# Patient Record
Sex: FEMALE | Race: WHITE | NOT HISPANIC OR LATINO | Employment: OTHER | URBAN - NONMETROPOLITAN AREA
[De-identification: names, ages, dates, MRNs, and addresses within clinical notes are randomized per-mention and may not be internally consistent; named-entity substitution may affect disease eponyms.]

---

## 2021-06-01 ENCOUNTER — OFFICE VISIT (OUTPATIENT)
Dept: CARDIOLOGY | Facility: CLINIC | Age: 56
End: 2021-06-01

## 2021-06-01 VITALS
BODY MASS INDEX: 38.27 KG/M2 | OXYGEN SATURATION: 95 % | DIASTOLIC BLOOD PRESSURE: 80 MMHG | HEIGHT: 63 IN | SYSTOLIC BLOOD PRESSURE: 146 MMHG | HEART RATE: 78 BPM | WEIGHT: 216 LBS

## 2021-06-01 DIAGNOSIS — R07.9 CHEST PAIN, UNSPECIFIED TYPE: Primary | ICD-10-CM

## 2021-06-01 DIAGNOSIS — R00.2 PALPITATIONS: ICD-10-CM

## 2021-06-01 DIAGNOSIS — R01.1 HEART MURMUR: ICD-10-CM

## 2021-06-01 DIAGNOSIS — R06.81 APNEA: ICD-10-CM

## 2021-06-01 DIAGNOSIS — R06.02 SHORTNESS OF BREATH: ICD-10-CM

## 2021-06-01 DIAGNOSIS — R60.0 LOWER EXTREMITY EDEMA: ICD-10-CM

## 2021-06-01 PROBLEM — R60.9 ACCUMULATION OF FLUID IN TISSUES: Status: ACTIVE | Noted: 2021-06-01

## 2021-06-01 PROCEDURE — 93000 ELECTROCARDIOGRAM COMPLETE: CPT | Performed by: PHYSICIAN ASSISTANT

## 2021-06-01 PROCEDURE — 99204 OFFICE O/P NEW MOD 45 MIN: CPT | Performed by: PHYSICIAN ASSISTANT

## 2021-06-01 RX ORDER — GABAPENTIN 300 MG/1
300 CAPSULE ORAL 2 TIMES DAILY
COMMUNITY

## 2021-06-01 RX ORDER — ARIPIPRAZOLE 5 MG/1
5 TABLET ORAL DAILY
COMMUNITY
Start: 2021-05-03

## 2021-06-01 RX ORDER — SOFT LENS DISINFECTANT
SOLUTION, NON-ORAL MISCELLANEOUS AS NEEDED
COMMUNITY

## 2021-06-01 RX ORDER — MELOXICAM 15 MG/1
15 TABLET ORAL DAILY
COMMUNITY
Start: 2021-05-10

## 2021-06-01 RX ORDER — DULAGLUTIDE 0.75 MG/.5ML
INJECTION, SOLUTION SUBCUTANEOUS WEEKLY
COMMUNITY
Start: 2021-05-31

## 2021-06-01 RX ORDER — LAMOTRIGINE 200 MG/1
200 TABLET ORAL DAILY
COMMUNITY
Start: 2021-04-06

## 2021-06-01 RX ORDER — CLONAZEPAM 0.5 MG/1
TABLET ORAL
COMMUNITY
Start: 2014-09-10

## 2021-06-01 RX ORDER — NITROGLYCERIN 0.4 MG/1
TABLET SUBLINGUAL
Qty: 25 TABLET | Refills: 11 | Status: SHIPPED | OUTPATIENT
Start: 2021-06-01

## 2021-06-01 RX ORDER — TRIAMTERENE AND HYDROCHLOROTHIAZIDE 37.5; 25 MG/1; MG/1
TABLET ORAL DAILY
COMMUNITY
Start: 2014-09-10

## 2021-06-01 RX ORDER — LEVOTHYROXINE SODIUM 0.05 MG/1
TABLET ORAL DAILY
COMMUNITY
Start: 2021-05-10

## 2021-06-01 RX ORDER — HYDROXYZINE PAMOATE 50 MG/1
25 CAPSULE ORAL 3 TIMES DAILY PRN
COMMUNITY
Start: 2021-05-03

## 2021-06-01 NOTE — PATIENT INSTRUCTIONS

## 2021-06-01 NOTE — PROGRESS NOTES
Subjective   Brenda Zazueta is a 56 y.o. female     Chief Complaint   Patient presents with   • New patient     Referred by Dr Carmichael   • Dizziness   • Edema     Stinging and numbness leg   • Shortness of Breath       HPI    Patient is a 56-year-old female that presents to the office to have evaluation.  She has no documented history of coronary disease or structural heart disease to my understanding.    She has been having issues of substernal discomfort at times in her chest.  She will feel a slight aching sensation substernally.  This occurs at random and resolves.  She has been short of breath.  When she tries to do activities she notices significant exertional dyspnea.  She also describes to me that she will at times have PND and orthopnea.  She has had some lower extremity edema and has been concerned as well.  She was placed on Maxide to help in regards to lower extremity edema    She has felt palpitations at times.  She will notice a random fluttering sensation in her chest.  She does not describe associated dizziness presyncope or syncope.  Otherwise, she presents today to have evaluation.          Current Outpatient Medications   Medication Sig Dispense Refill   • ALBUTEROL IN Inhale.     • ARIPiprazole (ABILIFY) 5 MG tablet 5 mg Daily.     • gabapentin (NEURONTIN) 300 MG capsule Take 300 mg by mouth 2 (two) times a day.     • hydrOXYzine pamoate (VISTARIL) 25 MG capsule 25 mg 3 (Three) Times a Day As Needed.     • lamoTRIgine (LaMICtal) 150 MG tablet Take 150 mg by mouth Daily.     • levothyroxine (SYNTHROID, LEVOTHROID) 50 MCG tablet Take  by mouth Daily.     • Loratadine-Pseudoephedrine (CLARITIN-D 24 HOUR PO) Take  by mouth.     • meloxicam (MOBIC) 15 MG tablet Take 15 mg by mouth Daily.     • Nebulizer device As Needed.     • triamterene-hydrochlorothiazide (MAXZIDE-25) 37.5-25 MG per tablet Take  by mouth Daily.     • Trulicity 0.75 MG/0.5ML solution pen-injector 1 (One) Time Per Week.     •  clonazePAM (KlonoPIN) 0.5 MG tablet Take  by mouth.     • nitroglycerin (NITROSTAT) 0.4 MG SL tablet 1 under the tongue as needed for angina, may repeat q5mins for up three doses 25 tablet 11     No current facility-administered medications for this visit.       Sulfa antibiotics    Past Medical History:   Diagnosis Date   • Anxiety    • Asthma    • Bipolar 2 disorder (CMS/HCC)    • Diabetes mellitus (CMS/HCC)    • Fibromyalgia    • Hypertension    • Hypothyroidism    • IBS (irritable bowel syndrome)    • OCD (obsessive compulsive disorder)    • Rheumatoid arthritis (CMS/HCC)        Social History     Socioeconomic History   • Marital status: Single     Spouse name: Not on file   • Number of children: Not on file   • Years of education: Not on file   • Highest education level: Not on file   Tobacco Use   • Smoking status: Never Smoker   • Smokeless tobacco: Never Used   Substance and Sexual Activity   • Alcohol use: Not Currently   • Drug use: Yes     Types: Marijuana   • Sexual activity: Defer       Family History   Problem Relation Age of Onset   • Heart disease Mother    • Hypertension Mother    • Thyroid disease Mother    • Lymphoma Mother    • Heart disease Father    • Diabetes Father    • Hypertension Father        Review of Systems   Constitutional: Positive for fatigue. Negative for chills and fever.   HENT: Positive for sinus pressure. Negative for congestion and sore throat.    Eyes: Positive for visual disturbance (glasses).   Respiratory: Positive for chest tightness (soreness x 6 months) and shortness of breath.    Cardiovascular: Positive for chest pain, palpitations and leg swelling.   Gastrointestinal: Positive for constipation and diarrhea. Negative for abdominal pain, blood in stool, nausea and vomiting.   Endocrine: Negative.  Negative for cold intolerance and heat intolerance.   Genitourinary: Negative.  Negative for dysuria, frequency, hematuria and urgency.   Musculoskeletal: Positive for back  "pain. Negative for arthralgias and neck pain.   Skin: Negative.  Negative for rash.   Allergic/Immunologic: Positive for environmental allergies. Negative for food allergies.   Neurological: Positive for dizziness (8-10 months if standing very long). Negative for syncope and light-headedness.   Hematological: Bruises/bleeds easily (bruises).   Psychiatric/Behavioral: Positive for agitation and sleep disturbance (wakes with soa, denies cp). The patient is nervous/anxious.        Objective   Vitals:    06/01/21 1432   BP: 146/80   BP Location: Left arm   Patient Position: Sitting   Pulse: 78   SpO2: 95%   Weight: 98 kg (216 lb)   Height: 160 cm (63\")      /80 (BP Location: Left arm, Patient Position: Sitting)   Pulse 78   Ht 160 cm (63\")   Wt 98 kg (216 lb)   SpO2 95%   BMI 38.26 kg/m²     Lab Results (most recent)     None          Physical Exam  Vitals and nursing note reviewed.   Constitutional:       General: She is not in acute distress.     Appearance: Normal appearance. She is well-developed.   HENT:      Head: Normocephalic and atraumatic.   Eyes:      General: No scleral icterus.        Right eye: No discharge.         Left eye: No discharge.      Conjunctiva/sclera: Conjunctivae normal.   Neck:      Vascular: Hepatojugular reflux present. No carotid bruit.   Cardiovascular:      Rate and Rhythm: Normal rate and regular rhythm.      Heart sounds: Murmur heard.   No friction rub. No gallop.    Pulmonary:      Effort: Pulmonary effort is normal. No respiratory distress.      Breath sounds: Normal breath sounds. No wheezing or rales.   Chest:      Chest wall: No tenderness.   Musculoskeletal:      Right lower leg: Edema present.      Left lower leg: Edema present.   Skin:     General: Skin is warm and dry.      Coloration: Skin is not pale.      Findings: No erythema or rash.   Neurological:      Mental Status: She is alert and oriented to person, place, and time.      Cranial Nerves: No cranial nerve " deficit.   Psychiatric:         Behavior: Behavior normal.         Procedure     ECG 12 Lead    Date/Time: 6/1/2021 2:44 PM  Performed by: Ehsan Merchant PA  Authorized by: Ehsan Merchant PA   Comparison: not compared with previous ECG   Previous ECG: no previous ECG available  Comments: EKG demonstrates sinus rhythm at 76 bpm with first-degree AV block and no acute ST changes                 Assessment/Plan     Problems Addressed this Visit        Cardiac and Vasculature    Heart murmur    Relevant Orders    Adult Transthoracic Echo Complete W/ Cont if Necessary Per Protocol    Treadmill Stress Test    Cardiac Event Monitor    Home Sleep Study    Palpitations    Relevant Orders    Adult Transthoracic Echo Complete W/ Cont if Necessary Per Protocol    Treadmill Stress Test    Cardiac Event Monitor    Home Sleep Study    Chest pain - Primary    Relevant Orders    Adult Transthoracic Echo Complete W/ Cont if Necessary Per Protocol    Treadmill Stress Test    Cardiac Event Monitor    Home Sleep Study       Pulmonary and Pneumonias    Shortness of breath    Relevant Orders    Adult Transthoracic Echo Complete W/ Cont if Necessary Per Protocol    Treadmill Stress Test    Cardiac Event Monitor    Home Sleep Study       Symptoms and Signs    Lower extremity edema    Relevant Orders    Adult Transthoracic Echo Complete W/ Cont if Necessary Per Protocol    Treadmill Stress Test    Cardiac Event Monitor    Home Sleep Study      Other Visit Diagnoses     Apnea        Relevant Orders    Home Sleep Study      Diagnoses       Codes Comments    Chest pain, unspecified type    -  Primary ICD-10-CM: R07.9  ICD-9-CM: 786.50     Shortness of breath     ICD-10-CM: R06.02  ICD-9-CM: 786.05     Palpitations     ICD-10-CM: R00.2  ICD-9-CM: 785.1     Heart murmur     ICD-10-CM: R01.1  ICD-9-CM: 785.2     Lower extremity edema     ICD-10-CM: R60.0  ICD-9-CM: 782.3     Apnea     ICD-10-CM: R06.81  ICD-9-CM: 786.03                      Recommendation  1.  Patient is a 56-year-old female that has been complaining of substernal discomfort in her chest but also shortness of breath lower extremity edema and complaints of PND and orthopnea.  On physical examination, because of patient's body habitus and the size of her neck, I cannot appreciate JVD although there appears to be a mild degree of HJR.  She has had lower extremity edema and shortness of breath and symptoms of congestive heart failure.  Also, she does have symptoms concerning for sleep apnea waking up short of breath and waking up feeling as if she has stopped breathing.  I would like to schedule echocardiogram to evaluate from this standpoint to assess LV systolic and diastolic function, valvular disease as there is a slight murmur on examination, as well as pulmonary pressures with symptoms concerning for sleep apnea    2.  Regular treadmill stress test in regards to chest discomfort.    3.  Event monitor to rule out any arrhythmic substrate based on her symptoms    4.  Nitroglycerin has been sent in as needed for chest pain.  Any chest pain, not resolved with nitroglycerin as discussed with the patient, she is to go to the ER.  We will see her back for follow-up on the above testing.  Follow-up with primary as scheduled           Electronically signed by:

## 2021-06-11 ENCOUNTER — TELEPHONE (OUTPATIENT)
Dept: CARDIOLOGY | Facility: CLINIC | Age: 56
End: 2021-06-11

## 2021-06-11 NOTE — TELEPHONE ENCOUNTER
Pt LVM asking if she can shower while wearing monitor.       I called and informed pt of the above, she stated [place] going rather [place]. I advised her that depth specifics are in her info packet, asked if she received one with her monitor. She stated she did. Pt verbalized understanding and will review packet.

## 2021-06-24 ENCOUNTER — TELEPHONE (OUTPATIENT)
Dept: CARDIOLOGY | Facility: CLINIC | Age: 56
End: 2021-06-24

## 2021-06-24 NOTE — TELEPHONE ENCOUNTER
Patient lvm requesting results of sleep study.    Requested results from Dr. Dorantes 06/24/21 @ 09:30am.     Attempted to request results as we still have not received and although their recorded line states they are open until 5:00pm there is no way to reach a  or leave a msg as their phone lines have been turned off.

## 2021-07-01 ENCOUNTER — TELEPHONE (OUTPATIENT)
Dept: CARDIOLOGY | Facility: CLINIC | Age: 56
End: 2021-07-01

## 2021-07-01 DIAGNOSIS — G47.30 SLEEP APNEA, UNSPECIFIED TYPE: Primary | ICD-10-CM

## 2021-07-22 ENCOUNTER — TELEPHONE (OUTPATIENT)
Dept: CARDIOLOGY | Facility: CLINIC | Age: 56
End: 2021-07-22

## 2021-07-28 ENCOUNTER — TELEPHONE (OUTPATIENT)
Dept: CARDIOLOGY | Facility: CLINIC | Age: 56
End: 2021-07-28

## 2021-08-04 ENCOUNTER — HOSPITAL ENCOUNTER (OUTPATIENT)
Dept: CARDIOLOGY | Facility: HOSPITAL | Age: 56
Discharge: HOME | End: 2021-08-04

## 2021-08-04 DIAGNOSIS — R60.0 LOWER EXTREMITY EDEMA: ICD-10-CM

## 2021-08-04 DIAGNOSIS — R01.1 HEART MURMUR: ICD-10-CM

## 2021-08-04 DIAGNOSIS — R07.9 CHEST PAIN, UNSPECIFIED TYPE: ICD-10-CM

## 2021-08-04 DIAGNOSIS — R06.02 SHORTNESS OF BREATH: ICD-10-CM

## 2021-08-04 DIAGNOSIS — R00.2 PALPITATIONS: ICD-10-CM

## 2021-08-04 PROCEDURE — 93018 CV STRESS TEST I&R ONLY: CPT | Performed by: INTERNAL MEDICINE

## 2021-08-04 PROCEDURE — 93017 CV STRESS TEST TRACING ONLY: CPT

## 2021-08-04 PROCEDURE — 93306 TTE W/DOPPLER COMPLETE: CPT

## 2021-08-04 PROCEDURE — 93306 TTE W/DOPPLER COMPLETE: CPT | Performed by: INTERNAL MEDICINE

## 2021-08-05 ENCOUNTER — TELEPHONE (OUTPATIENT)
Dept: CARDIOLOGY | Facility: CLINIC | Age: 56
End: 2021-08-05

## 2021-08-05 LAB
BH CV STRESS RECOVERY BP: NORMAL MMHG
BH CV STRESS RECOVERY HR: 84 BPM
MAXIMAL PREDICTED HEART RATE: 162 BPM
PERCENT MAX PREDICTED HR: 79.63 %
STRESS BASELINE BP: NORMAL MMHG
STRESS BASELINE HR: 68 BPM
STRESS PERCENT HR: 94 %
STRESS POST ESTIMATED WORKLOAD: 7 METS
STRESS POST EXERCISE DUR MIN: 6 MIN
STRESS POST EXERCISE DUR SEC: 28 SEC
STRESS POST PEAK BP: NORMAL MMHG
STRESS POST PEAK HR: 129 BPM
STRESS TARGET HR: 138 BPM

## 2021-08-05 NOTE — TELEPHONE ENCOUNTER
Patient informed of stress test results. Patient verbalized understanding. Liana Anaya LPN      ----- Message from BRYSON Do sent at 8/5/2021  2:00 PM EDT -----  Routine follow-up  Treadmill Stress Test  Order: [id number]  Status:  Final result   Visible to patient:  No (not released) Dx:  Palpitations; Shortness of breath; He...   1 Result Note  Details    Reading Physician Reading Date Result Priority   Brennen Morgan MD  [phone] 8/4/2021 Routine      Result Text  1.  Adequate functional capacity without chest pain.  The patient exercised 6 minutes and 28 seconds on a Som protocol to a heart rate of 129, systolic blood pressure 199, rate-pressure product of greater than 25,000, and an O2 consumption of 7 METS.  The patient achieved 79% of age adjusted maximum predicted heart rate.  Exercise was stopped at patient request because of fatigue and shortness of breath.     2.  Physiologic heart rate and mildly exaggerated blood pressure responses to exercise.     3.  Exercise EKGs are completely uninterpretable because of motion artifact.  There is no evidence of ST segment displacement in recovery.     4.  No exercise-induced dysrhythmia.

## 2021-08-22 LAB
BH CV ECHO MEAS - ACS: 1.9 CM
BH CV ECHO MEAS - AO MAX PG: 8 MMHG
BH CV ECHO MEAS - AO MEAN PG: 5 MMHG
BH CV ECHO MEAS - AO ROOT AREA (BSA CORRECTED): 1.4
BH CV ECHO MEAS - AO ROOT AREA: 6.2 CM^2
BH CV ECHO MEAS - AO ROOT DIAM: 2.8 CM
BH CV ECHO MEAS - AO V2 MAX: 141 CM/SEC
BH CV ECHO MEAS - AO V2 MEAN: 99.1 CM/SEC
BH CV ECHO MEAS - AO V2 VTI: 33.1 CM
BH CV ECHO MEAS - BSA(HAYCOCK): 2.1 M^2
BH CV ECHO MEAS - BSA: 2 M^2
BH CV ECHO MEAS - BZI_BMI: 38.3 KILOGRAMS/M^2
BH CV ECHO MEAS - BZI_METRIC_HEIGHT: 160 CM
BH CV ECHO MEAS - BZI_METRIC_WEIGHT: 98 KG
BH CV ECHO MEAS - EDV(CUBED): 50.7 ML
BH CV ECHO MEAS - EDV(MOD-SP4): 51.7 ML
BH CV ECHO MEAS - EDV(TEICH): 58.1 ML
BH CV ECHO MEAS - EF(CUBED): 85.6 %
BH CV ECHO MEAS - EF(MOD-SP4): 44.1 %
BH CV ECHO MEAS - EF(TEICH): 79.7 %
BH CV ECHO MEAS - EF_3D-VOL: 48 %
BH CV ECHO MEAS - ESV(CUBED): 7.3 ML
BH CV ECHO MEAS - ESV(MOD-SP4): 28.9 ML
BH CV ECHO MEAS - ESV(TEICH): 11.8 ML
BH CV ECHO MEAS - FS: 47.6 %
BH CV ECHO MEAS - IVS/LVPW: 1.2
BH CV ECHO MEAS - IVSD: 1.5 CM
BH CV ECHO MEAS - LA DIMENSION: 4.1 CM
BH CV ECHO MEAS - LA/AO: 1.5
BH CV ECHO MEAS - LV DIASTOLIC VOL/BSA (35-75): 25.9 ML/M^2
BH CV ECHO MEAS - LV IVRT: 0.08 SEC
BH CV ECHO MEAS - LV MASS(C)D: 192.3 GRAMS
BH CV ECHO MEAS - LV MASS(C)DI: 96.2 GRAMS/M^2
BH CV ECHO MEAS - LV SYSTOLIC VOL/BSA (12-30): 14.5 ML/M^2
BH CV ECHO MEAS - LVIDD: 3.7 CM
BH CV ECHO MEAS - LVIDS: 1.9 CM
BH CV ECHO MEAS - LVLD AP4: 6.7 CM
BH CV ECHO MEAS - LVLS AP4: 5.6 CM
BH CV ECHO MEAS - LVOT AREA (M): 4.2 CM^2
BH CV ECHO MEAS - LVOT AREA: 4.2 CM^2
BH CV ECHO MEAS - LVOT DIAM: 2.3 CM
BH CV ECHO MEAS - LVPWD: 1.3 CM
BH CV ECHO MEAS - MV A MAX VEL: 98.5 CM/SEC
BH CV ECHO MEAS - MV DEC SLOPE: 370 CM/SEC^2
BH CV ECHO MEAS - MV E MAX VEL: 105 CM/SEC
BH CV ECHO MEAS - MV E/A: 1.1
BH CV ECHO MEAS - RVDD: 2.8 CM
BH CV ECHO MEAS - SI(AO): 102 ML/M^2
BH CV ECHO MEAS - SI(CUBED): 21.7 ML/M^2
BH CV ECHO MEAS - SI(MOD-SP4): 11.4 ML/M^2
BH CV ECHO MEAS - SI(TEICH): 23.2 ML/M^2
BH CV ECHO MEAS - SV(AO): 203.8 ML
BH CV ECHO MEAS - SV(CUBED): 43.4 ML
BH CV ECHO MEAS - SV(MOD-SP4): 22.8 ML
BH CV ECHO MEAS - SV(TEICH): 46.4 ML
MAXIMAL PREDICTED HEART RATE: 162 BPM
STRESS TARGET HR: 138 BPM

## 2021-08-23 ENCOUNTER — TELEPHONE (OUTPATIENT)
Dept: CARDIOLOGY | Facility: CLINIC | Age: 56
End: 2021-08-23

## 2021-08-23 NOTE — TELEPHONE ENCOUNTER
Patient informed of echo results, and reminded up upcoming appointment. Patient verbalized understanding. Liana Anaya LPN      ----- Message from BRYSON Do sent at 8/23/2021  1:01 PM EDT -----  Routine follow-up  Adult Transthoracic Echo Complete W/ Cont if Necessary Per Protocol  Order: [id number]  Status:  Final result   Visible to patient:  No (not released) Dx:  Palpitations; Shortness of breath; He...   1 Result Note  Details    Reading Physician Reading Date Result Priority   Brennen Morgan MD  [phone] 8/4/2021 Routine      Result Text  1.  LV size, function, and wall motion are normal.  Visually estimated ejection fraction is 50%.  3D ejection fraction is 48%.  Mild concentric left ventricular hypertrophy with grade 1A diastolic dysfunction.  Mild left atrial enlargement.  Right heart chambers are normal.  No septal defect or intracavitary mass or thrombus.     2.  Valves are morphologically and physiologically normal with no stenotic lesions, valve associated masses or thrombi, or hemodynamically significant regurgitation.     3.  No pericardial or great vessel pathology.     4.  Pulmonary artery pressures cannot be calculated.  All Measurements

## 2021-09-02 ENCOUNTER — OFFICE VISIT (OUTPATIENT)
Dept: CARDIOLOGY | Facility: CLINIC | Age: 56
End: 2021-09-02

## 2021-09-02 VITALS
OXYGEN SATURATION: 96 % | HEART RATE: 78 BPM | WEIGHT: 221 LBS | DIASTOLIC BLOOD PRESSURE: 88 MMHG | SYSTOLIC BLOOD PRESSURE: 146 MMHG | BODY MASS INDEX: 39.16 KG/M2 | HEIGHT: 63 IN

## 2021-09-02 DIAGNOSIS — R60.0 LOWER EXTREMITY EDEMA: ICD-10-CM

## 2021-09-02 DIAGNOSIS — R06.02 SHORTNESS OF BREATH: Primary | ICD-10-CM

## 2021-09-02 DIAGNOSIS — R00.2 PALPITATIONS: ICD-10-CM

## 2021-09-02 PROCEDURE — 99213 OFFICE O/P EST LOW 20 MIN: CPT | Performed by: PHYSICIAN ASSISTANT

## 2021-09-02 RX ORDER — FLUTICASONE PROPIONATE 50 MCG
2 SPRAY, SUSPENSION (ML) NASAL DAILY
COMMUNITY

## 2021-09-02 RX ORDER — ATOMOXETINE 25 MG/1
25 CAPSULE ORAL DAILY
COMMUNITY

## 2021-09-02 RX ORDER — ATORVASTATIN CALCIUM 10 MG/1
10 TABLET, FILM COATED ORAL DAILY
COMMUNITY

## 2021-09-02 RX ORDER — CETIRIZINE HYDROCHLORIDE 10 MG/1
10 TABLET ORAL DAILY
COMMUNITY

## 2021-09-02 NOTE — PROGRESS NOTES
Problem list     Subjective   Brenda Zazueta is a 56 y.o. female     Chief Complaint   Patient presents with   • Testing follow up     Stress, echo, monitor 8/4/21       HPI    Patient is a 56-year-old female who presents back to the office for follow-up.  Patient was seen in the office with complaints of edema and shortness of breath and findings concerning for possible failure.  She was on a triamterene hydrochlorothiazide combination diuretic pedal.  She was scheduled for testing to include stress test, echocardiogram and event monitor.    Echo suggested normal systolic function of 50%, grade 1 diastolic dysfunction no critical valve disease or effusion.  Regular treadmill stress test was considered low risk event monitor showed no malignant arrhythmia    She is feeling much better.  She does not describe any chest discomfort.  She has dyspnea that is mild but overall was doing better.  She does not describe PND orthopnea.    Her edema has improved significantly.  She occasionally palpitates medication is pancreatitis but otherwise she feels well.  Current Outpatient Medications on File Prior to Visit   Medication Sig Dispense Refill   • ALBUTEROL IN Inhale As Needed.     • ARIPiprazole (ABILIFY) 5 MG tablet 5 mg Daily.     • atomoxetine (STRATTERA) 25 MG capsule Take 25 mg by mouth Daily.     • atorvastatin (LIPITOR) 10 MG tablet Take 10 mg by mouth Daily.     • cetirizine (zyrTEC) 10 MG tablet Take 10 mg by mouth Daily.     • clonazePAM (KlonoPIN) 0.5 MG tablet Take  by mouth.     • fluticasone (FLONASE) 50 MCG/ACT nasal spray 2 sprays into the nostril(s) as directed by provider Daily.     • gabapentin (NEURONTIN) 300 MG capsule Take 300 mg by mouth 2 (two) times a day.     • hydrOXYzine pamoate (VISTARIL) 50 MG capsule 25 mg 3 (Three) Times a Day As Needed.     • lamoTRIgine (LaMICtal) 200 MG tablet Take 200 mg by mouth Daily.     • levothyroxine (SYNTHROID, LEVOTHROID) 50 MCG tablet Take  by mouth Daily.      • meloxicam (MOBIC) 15 MG tablet Take 15 mg by mouth Daily.     • Nebulizer device As Needed.     • nitroglycerin (NITROSTAT) 0.4 MG SL tablet 1 under the tongue as needed for angina, may repeat q5mins for up three doses 25 tablet 11   • triamterene-hydrochlorothiazide (MAXZIDE-25) 37.5-25 MG per tablet Take  by mouth Daily.     • Trulicity 0.75 MG/0.5ML solution pen-injector 1 (One) Time Per Week. 2 inj weekly     • [DISCONTINUED] Loratadine-Pseudoephedrine (CLARITIN-D 24 HOUR PO) Take  by mouth.       No current facility-administered medications on file prior to visit.       Sulfa antibiotics    Past Medical History:   Diagnosis Date   • Anxiety    • Asthma    • Bipolar 2 disorder (CMS/HCC)    • Diabetes mellitus (CMS/HCC)    • Fibromyalgia    • Hypertension    • Hypothyroidism    • IBS (irritable bowel syndrome)    • OCD (obsessive compulsive disorder)    • Rheumatoid arthritis (CMS/HCC)    • Sleep apnea        Social History     Socioeconomic History   • Marital status: Single     Spouse name: Not on file   • Number of children: Not on file   • Years of education: Not on file   • Highest education level: Not on file   Tobacco Use   • Smoking status: Never Smoker   • Smokeless tobacco: Never Used   Substance and Sexual Activity   • Alcohol use: Not Currently   • Drug use: Yes     Types: Marijuana   • Sexual activity: Defer       Family History   Problem Relation Age of Onset   • Heart disease Mother    • Hypertension Mother    • Thyroid disease Mother    • Lymphoma Mother    • Heart disease Father    • Diabetes Father    • Hypertension Father        Review of Systems   Constitutional: Negative.  Negative for chills, fatigue and fever.   HENT: Positive for congestion. Negative for sinus pressure and sore throat.    Eyes: Positive for visual disturbance.   Respiratory: Positive for apnea. Negative for chest tightness and shortness of breath.    Cardiovascular: Positive for palpitations (races at times). Negative  "for chest pain and leg swelling.   Gastrointestinal: Negative.  Negative for abdominal pain, blood in stool, constipation, diarrhea, nausea and vomiting.   Endocrine: Negative.  Negative for cold intolerance and heat intolerance.   Genitourinary: Negative.  Negative for dysuria, frequency, hematuria and urgency.   Musculoskeletal: Negative.  Negative for arthralgias, back pain and neck pain.   Skin: Negative.  Negative for rash and wound.   Allergic/Immunologic: Positive for environmental allergies. Negative for food allergies.   Neurological: Negative.  Negative for dizziness, syncope and light-headedness.   Hematological: Negative.  Does not bruise/bleed easily.   Psychiatric/Behavioral: Positive for sleep disturbance (not currently using cpap, denies waking with soa or cp).       Objective   Vitals:    09/02/21 1306   BP: 146/88   BP Location: Left arm   Patient Position: Sitting   Pulse: 78   SpO2: 96%   Weight: 100 kg (221 lb)   Height: 160 cm (63\")      /88 (BP Location: Left arm, Patient Position: Sitting)   Pulse 78   Ht 160 cm (63\")   Wt 100 kg (221 lb)   SpO2 96%   BMI 39.15 kg/m²     Lab Results (most recent)     None          Physical Exam  Vitals and nursing note reviewed.   Constitutional:       General: She is not in acute distress.     Appearance: Normal appearance. She is well-developed.   HENT:      Head: Normocephalic and atraumatic.   Eyes:      General: No scleral icterus.        Right eye: No discharge.         Left eye: No discharge.      Conjunctiva/sclera: Conjunctivae normal.   Neck:      Vascular: No carotid bruit.   Cardiovascular:      Rate and Rhythm: Normal rate and regular rhythm.      Heart sounds: Normal heart sounds. No murmur heard.   No friction rub. No gallop.    Pulmonary:      Effort: Pulmonary effort is normal. No respiratory distress.      Breath sounds: Normal breath sounds. No wheezing or rales.   Chest:      Chest wall: No tenderness.   Musculoskeletal:      " Right lower leg: No edema.      Left lower leg: No edema.   Skin:     General: Skin is warm and dry.      Coloration: Skin is not pale.      Findings: No erythema or rash.   Neurological:      Mental Status: She is alert and oriented to person, place, and time.      Cranial Nerves: No cranial nerve deficit.   Psychiatric:         Behavior: Behavior normal.         Procedure   Procedures       Assessment/Plan     Problems Addressed this Visit        Cardiac and Vasculature    Palpitations       Pulmonary and Pneumonias    Shortness of breath - Primary       Symptoms and Signs    Lower extremity edema      Diagnoses       Codes Comments    Shortness of breath    -  Primary ICD-10-CM: R06.02  ICD-9-CM: 786.05     Palpitations     ICD-10-CM: R00.2  ICD-9-CM: 785.1     Lower extremity edema     ICD-10-CM: R60.0  ICD-9-CM: 782.3           Recommendation  1.  Patient is a 56-year-old female who presents back to the office for evaluation.  She is doing well.  On diuretic therapy, she feels much improved.  She has good LV function with grade 1 diastolic dysfunction.  For now nothing further    2.  Lower extremity edema is controlled we will continue diuretic therapy    3.  Palpitations are minimal with no malignant arrhythmia identified on event monitoring.    4.  For now we will continue the same and see her back for follow-up in 6 months or sooner as symptoms discussed.  Follow-up with primary as scheduled      Patient brought in medicine bottles to appointment, they have been gone through with the patient. Med list was updated in the chart.          Electronically signed by:

## 2021-09-02 NOTE — PATIENT INSTRUCTIONS

## 2023-05-15 ENCOUNTER — OFFICE VISIT (OUTPATIENT)
Dept: CARDIOLOGY | Facility: CLINIC | Age: 58
End: 2023-05-15
Payer: COMMERCIAL

## 2023-05-15 VITALS
DIASTOLIC BLOOD PRESSURE: 87 MMHG | WEIGHT: 187.3 LBS | BODY MASS INDEX: 33.19 KG/M2 | HEIGHT: 63 IN | OXYGEN SATURATION: 99 % | SYSTOLIC BLOOD PRESSURE: 153 MMHG | HEART RATE: 73 BPM

## 2023-05-15 DIAGNOSIS — R60.0 LOWER EXTREMITY EDEMA: Primary | ICD-10-CM

## 2023-05-15 DIAGNOSIS — I10 PRIMARY HYPERTENSION: ICD-10-CM

## 2023-05-15 PROCEDURE — 3079F DIAST BP 80-89 MM HG: CPT | Performed by: PHYSICIAN ASSISTANT

## 2023-05-15 PROCEDURE — 3077F SYST BP >= 140 MM HG: CPT | Performed by: PHYSICIAN ASSISTANT

## 2023-05-15 PROCEDURE — 99213 OFFICE O/P EST LOW 20 MIN: CPT | Performed by: PHYSICIAN ASSISTANT

## 2023-05-15 PROCEDURE — 93000 ELECTROCARDIOGRAM COMPLETE: CPT | Performed by: PHYSICIAN ASSISTANT

## 2023-05-15 RX ORDER — CYCLOBENZAPRINE HCL 5 MG
5 TABLET ORAL 3 TIMES DAILY PRN
COMMUNITY

## 2023-05-15 NOTE — PROGRESS NOTES
Problem list     Subjective   Brenda Zazueta is a 58 y.o. female     Chief Complaint   Patient presents with   • Follow-up     YEARLY F/U    Problem list  1.  History of chest pain  1.1 regular treadmill stress test 2021 with no electrocardiographic evidence of ischemia  2.  Preserved systolic function  3.  Palpitations  3.1 event monitor 2021 with no arrhythmias identified  4.  Lower extremity edema  5.  Asthma    HPI    Patient is a 58-year-old female who presents to the office to be evaluated for routine yearly follow-up.  Overall, patient has done remarkably well.  She has lost approximately 40 pounds but trying lifestyle modification and diet.  She feels much better after weight loss.  She has no chest pain or pressure whatsoever.  She has no complaints of dyspnea with exception of possible asthma exacerbations.  She described over the weekend dealing with spreading hay and had some shortness of breath requiring an albuterol inhaler.  She does not describe PND or orthopnea.    She does not describe palpitating nor does she complain of dysrhythmic symptoms.  Patient is stable otherwise.    Current Outpatient Medications on File Prior to Visit   Medication Sig Dispense Refill   • ALBUTEROL IN Inhale As Needed.     • ARIPiprazole (ABILIFY) 5 MG tablet 1 tablet Daily.     • cetirizine (zyrTEC) 10 MG tablet Take 1 tablet by mouth Daily.     • cyclobenzaprine (FLEXERIL) 5 MG tablet Take 1 tablet by mouth 3 (Three) Times a Day As Needed for Muscle Spasms.     • fluticasone (FLONASE) 50 MCG/ACT nasal spray 2 sprays into the nostril(s) as directed by provider Daily.     • gabapentin (NEURONTIN) 300 MG capsule Take 1 capsule by mouth 2 (two) times a day.     • hydrOXYzine pamoate (VISTARIL) 50 MG capsule 25 mg 3 (Three) Times a Day As Needed.     • lamoTRIgine (LaMICtal) 200 MG tablet Take 1 tablet by mouth Daily.     • levothyroxine (SYNTHROID, LEVOTHROID) 50 MCG tablet Take  by mouth Daily.     • Nebulizer device As  Needed.     • nitroglycerin (NITROSTAT) 0.4 MG SL tablet 1 under the tongue as needed for angina, may repeat q5mins for up three doses 25 tablet 11   • triamcinolone (KENALOG) 0.1 % ointment Apply  topically to the appropriate area as directed 2 (Two) Times a Day. 1 each 1   • triamterene-hydrochlorothiazide (MAXZIDE-25) 37.5-25 MG per tablet Take  by mouth Daily.     • atomoxetine (STRATTERA) 25 MG capsule Take 1 capsule by mouth Daily.     • atorvastatin (LIPITOR) 10 MG tablet Take 1 tablet by mouth Daily.     • clonazePAM (KlonoPIN) 0.5 MG tablet Take  by mouth.     • meloxicam (MOBIC) 15 MG tablet Take 1 tablet by mouth Daily.     • Trulicity 0.75 MG/0.5ML solution pen-injector 1 (One) Time Per Week. 2 inj weekly       No current facility-administered medications on file prior to visit.       Sulfa antibiotics    Past Medical History:   Diagnosis Date   • Anxiety    • Asthma    • Bipolar 2 disorder    • Diabetes mellitus    • Fibromyalgia    • Hypertension    • Hypothyroidism    • IBS (irritable bowel syndrome)    • OCD (obsessive compulsive disorder)    • Rheumatoid arthritis    • Sleep apnea        Social History     Socioeconomic History   • Marital status: Single   Tobacco Use   • Smoking status: Never   • Smokeless tobacco: Never   Substance and Sexual Activity   • Alcohol use: Not Currently   • Drug use: Yes     Types: Marijuana   • Sexual activity: Defer       Family History   Problem Relation Age of Onset   • Heart disease Mother    • Hypertension Mother    • Thyroid disease Mother    • Lymphoma Mother    • Heart disease Father    • Diabetes Father    • Hypertension Father        Review of Systems   Constitutional: Negative for appetite change, chills, diaphoresis and fever.   HENT: Negative.  Negative for ear discharge, ear pain, sinus pressure, sneezing and tinnitus.    Eyes: Negative for photophobia, pain, redness, itching and visual disturbance.   Respiratory: Positive for cough, shortness of breath  "and wheezing. Negative for apnea, choking and chest tightness.    Cardiovascular: Positive for leg swelling. Negative for chest pain and palpitations.   Gastrointestinal: Negative for blood in stool, constipation, diarrhea, nausea and rectal pain.   Genitourinary: Negative.  Negative for difficulty urinating.   Musculoskeletal: Positive for arthralgias and neck pain. Negative for back pain and myalgias.   Skin: Negative for rash and wound.   Neurological: Negative for dizziness, weakness and numbness.   Psychiatric/Behavioral: Positive for sleep disturbance.       Objective   Vitals:    05/15/23 0848   BP: 153/87   Pulse: 73   SpO2: 99%   Weight: 85 kg (187 lb 4.8 oz)   Height: 160 cm (62.99\")      /87   Pulse 73   Ht 160 cm (62.99\")   Wt 85 kg (187 lb 4.8 oz)   SpO2 99%   BMI 33.19 kg/m²     Lab Results (most recent)     None          Physical Exam  Vitals and nursing note reviewed.   Constitutional:       General: She is not in acute distress.     Appearance: Normal appearance. She is well-developed.   HENT:      Head: Normocephalic and atraumatic.   Eyes:      General: No scleral icterus.        Right eye: No discharge.         Left eye: No discharge.      Conjunctiva/sclera: Conjunctivae normal.   Neck:      Vascular: No carotid bruit.   Cardiovascular:      Rate and Rhythm: Normal rate and regular rhythm.      Heart sounds: Normal heart sounds. No murmur heard.    No friction rub. No gallop.   Pulmonary:      Effort: Pulmonary effort is normal. No respiratory distress.      Breath sounds: Normal breath sounds. No wheezing or rales.   Chest:      Chest wall: No tenderness.   Musculoskeletal:      Right lower leg: No edema.      Left lower leg: No edema.   Skin:     General: Skin is warm and dry.      Coloration: Skin is not pale.      Findings: No erythema or rash.   Neurological:      Mental Status: She is alert and oriented to person, place, and time.      Cranial Nerves: No cranial nerve deficit. "   Psychiatric:         Behavior: Behavior normal.         Procedure     ECG 12 Lead    Date/Time: 5/15/2023 8:52 AM  Performed by: Ehsan Merchant PA  Authorized by: Ehsan Merchant PA   Comparison: compared with previous ECG from 6/1/2021  Comments: EKG demonstrates sinus rhythm at 70bpm, first-degree AV block at 200 ms with no acute ST changes               Assessment & Plan     Problems Addressed this Visit        Cardiac and Vasculature    Primary hypertension       Symptoms and Signs    Lower extremity edema - Primary   Diagnoses       Codes Comments    Lower extremity edema    -  Primary ICD-10-CM: R60.0  ICD-9-CM: 782.3     Primary hypertension     ICD-10-CM: I10  ICD-9-CM: 401.9         Recommendation  1.  Patient is a 58-year-old female who presents to the office to be evaluated that is doing markedly well.  She has no angina or failure symptoms.  She has done well for now, we will monitor.    2.  Patient with mild lower extremity edema using Maxide for treatment.  She has done well from that standpoint.  She has history of preserved systolic function.    3.  Patient with baseline hypertension. It is slightly elevated but patient describes being rushed getting here this morning.  We can monitor for now.  We will see patient back for follow-up in a year or sooner as symptoms discussed.  Her labs are being monitored by primary.  Follow-up with primary as scheduled.             Brenda Zazueta  reports that she has never smoked. She has never used smokeless tobacco..                Electronically signed by:

## 2023-05-15 NOTE — LETTER
May 15, 2023     Darryl Carmichael MD  [address] [place] [id number]    Patient: Brenda Zazueta   YOB: 1965   Date of Visit: 5/15/2023       Dear Darryl Carmichael MD    Brenda Zazueta was in my office today. Below is a copy of my note.    If you have questions, please do not hesitate to call me. I look forward to following Brenda along with you.         Sincerely,        BRYSON Snowden        CC: No Recipients    Problem list     Subjective    Brenda Zazueta is a 58 y.o. female     Chief Complaint   Patient presents with   • Follow-up     YEARLY F/U    Problem list  1.  History of chest pain  1.1 regular treadmill stress test 2021 with no electrocardiographic evidence of ischemia  2.  Preserved systolic function  3.  Palpitations  3.1 event monitor 2021 with no arrhythmias identified  4.  Lower extremity edema  5.  Asthma    HPI    Patient is a 58-year-old female who presents to the office to be evaluated for routine yearly follow-up.  Overall, patient has done remarkably well.  She has lost approximately 40 pounds but trying lifestyle modification and diet.  She feels much better after weight loss.  She has no chest pain or pressure whatsoever.  She has no complaints of dyspnea with exception of possible asthma exacerbations.  She described over the weekend dealing with spreading hay and had some shortness of breath requiring an albuterol inhaler.  She does not describe PND or orthopnea.    She does not describe palpitating nor does she complain of dysrhythmic symptoms.  Patient is stable otherwise.    Current Outpatient Medications on File Prior to Visit   Medication Sig Dispense Refill   • ALBUTEROL IN Inhale As Needed.     • ARIPiprazole (ABILIFY) 5 MG tablet 1 tablet Daily.     • cetirizine (zyrTEC) 10 MG tablet Take 1 tablet by mouth Daily.     • cyclobenzaprine (FLEXERIL) 5 MG tablet Take 1 tablet by mouth 3 (Three) Times a Day As Needed for Muscle Spasms.     • fluticasone  (FLONASE) 50 MCG/ACT nasal spray 2 sprays into the nostril(s) as directed by provider Daily.     • gabapentin (NEURONTIN) 300 MG capsule Take 1 capsule by mouth 2 (two) times a day.     • hydrOXYzine pamoate (VISTARIL) 50 MG capsule 25 mg 3 (Three) Times a Day As Needed.     • lamoTRIgine (LaMICtal) 200 MG tablet Take 1 tablet by mouth Daily.     • levothyroxine (SYNTHROID, LEVOTHROID) 50 MCG tablet Take  by mouth Daily.     • Nebulizer device As Needed.     • nitroglycerin (NITROSTAT) 0.4 MG SL tablet 1 under the tongue as needed for angina, may repeat q5mins for up three doses 25 tablet 11   • triamcinolone (KENALOG) 0.1 % ointment Apply  topically to the appropriate area as directed 2 (Two) Times a Day. 1 each 1   • triamterene-hydrochlorothiazide (MAXZIDE-25) 37.5-25 MG per tablet Take  by mouth Daily.     • atomoxetine (STRATTERA) 25 MG capsule Take 1 capsule by mouth Daily.     • atorvastatin (LIPITOR) 10 MG tablet Take 1 tablet by mouth Daily.     • clonazePAM (KlonoPIN) 0.5 MG tablet Take  by mouth.     • meloxicam (MOBIC) 15 MG tablet Take 1 tablet by mouth Daily.     • Trulicity 0.75 MG/0.5ML solution pen-injector 1 (One) Time Per Week. 2 inj weekly       No current facility-administered medications on file prior to visit.       Sulfa antibiotics    Past Medical History:   Diagnosis Date   • Anxiety    • Asthma    • Bipolar 2 disorder    • Diabetes mellitus    • Fibromyalgia    • Hypertension    • Hypothyroidism    • IBS (irritable bowel syndrome)    • OCD (obsessive compulsive disorder)    • Rheumatoid arthritis    • Sleep apnea        Social History     Socioeconomic History   • Marital status: Single   Tobacco Use   • Smoking status: Never   • Smokeless tobacco: Never   Substance and Sexual Activity   • Alcohol use: Not Currently   • Drug use: Yes     Types: Marijuana   • Sexual activity: Defer       Family History   Problem Relation Age of Onset   • Heart disease Mother    • Hypertension Mother    •  "Thyroid disease Mother    • Lymphoma Mother    • Heart disease Father    • Diabetes Father    • Hypertension Father        Review of Systems   Constitutional: Negative for appetite change, chills, diaphoresis and fever.   HENT: Negative.  Negative for ear discharge, ear pain, sinus pressure, sneezing and tinnitus.    Eyes: Negative for photophobia, pain, redness, itching and visual disturbance.   Respiratory: Positive for cough, shortness of breath and wheezing. Negative for apnea, choking and chest tightness.    Cardiovascular: Positive for leg swelling. Negative for chest pain and palpitations.   Gastrointestinal: Negative for blood in stool, constipation, diarrhea, nausea and rectal pain.   Genitourinary: Negative.  Negative for difficulty urinating.   Musculoskeletal: Positive for arthralgias and neck pain. Negative for back pain and myalgias.   Skin: Negative for rash and wound.   Neurological: Negative for dizziness, weakness and numbness.   Psychiatric/Behavioral: Positive for sleep disturbance.       Objective    Vitals:    05/15/23 0848   BP: 153/87   Pulse: 73   SpO2: 99%   Weight: 85 kg (187 lb 4.8 oz)   Height: 160 cm (62.99\")      /87   Pulse 73   Ht 160 cm (62.99\")   Wt 85 kg (187 lb 4.8 oz)   SpO2 99%   BMI 33.19 kg/m²     Lab Results (most recent)       None            Physical Exam  Vitals and nursing note reviewed.   Constitutional:       General: She is not in acute distress.     Appearance: Normal appearance. She is well-developed.   HENT:      Head: Normocephalic and atraumatic.   Eyes:      General: No scleral icterus.        Right eye: No discharge.         Left eye: No discharge.      Conjunctiva/sclera: Conjunctivae normal.   Neck:      Vascular: No carotid bruit.   Cardiovascular:      Rate and Rhythm: Normal rate and regular rhythm.      Heart sounds: Normal heart sounds. No murmur heard.    No friction rub. No gallop.   Pulmonary:      Effort: Pulmonary effort is normal. No " respiratory distress.      Breath sounds: Normal breath sounds. No wheezing or rales.   Chest:      Chest wall: No tenderness.   Musculoskeletal:      Right lower leg: No edema.      Left lower leg: No edema.   Skin:     General: Skin is warm and dry.      Coloration: Skin is not pale.      Findings: No erythema or rash.   Neurological:      Mental Status: She is alert and oriented to person, place, and time.      Cranial Nerves: No cranial nerve deficit.   Psychiatric:         Behavior: Behavior normal.         Procedure      ECG 12 Lead    Date/Time: 5/15/2023 8:52 AM  Performed by: Ehsan Merchant PA  Authorized by: Ehsan Merchant PA   Comparison: compared with previous ECG from 6/1/2021  Comments: EKG demonstrates sinus rhythm at 70bpm, first-degree AV block at 200 ms with no acute ST changes              Assessment & Plan     Problems Addressed this Visit          Cardiac and Vasculature    Primary hypertension       Symptoms and Signs    Lower extremity edema - Primary     Diagnoses         Codes Comments    Lower extremity edema    -  Primary ICD-10-CM: R60.0  ICD-9-CM: 782.3     Primary hypertension     ICD-10-CM: I10  ICD-9-CM: 401.9           Recommendation  1.  Patient is a 58-year-old female who presents to the office to be evaluated that is doing markedly well.  She has no angina or failure symptoms.  She has done well for now, we will monitor.    2.  Patient with mild lower extremity edema using Maxide for treatment.  She has done well from that standpoint.  She has history of preserved systolic function.    3.  Patient with baseline hypertension. It is slightly elevated but patient describes being rushed getting here this morning.  We can monitor for now.  We will see patient back for follow-up in a year or sooner as symptoms discussed.  Her labs are being monitored by primary.  Follow-up with primary as scheduled.            Brenda Zazueta  reports that she has never smoked. She has never used  smokeless tobacco..                Electronically signed by:

## 2024-05-15 ENCOUNTER — LAB (OUTPATIENT)
Dept: CARDIOLOGY | Facility: CLINIC | Age: 59
End: 2024-05-15
Payer: COMMERCIAL

## 2024-05-15 ENCOUNTER — TELEPHONE (OUTPATIENT)
Dept: CARDIOLOGY | Facility: CLINIC | Age: 59
End: 2024-05-15

## 2024-05-15 ENCOUNTER — OFFICE VISIT (OUTPATIENT)
Dept: CARDIOLOGY | Facility: CLINIC | Age: 59
End: 2024-05-15
Payer: COMMERCIAL

## 2024-05-15 VITALS
SYSTOLIC BLOOD PRESSURE: 128 MMHG | WEIGHT: 210 LBS | DIASTOLIC BLOOD PRESSURE: 86 MMHG | OXYGEN SATURATION: 97 % | HEART RATE: 69 BPM | BODY MASS INDEX: 37.21 KG/M2 | HEIGHT: 63 IN

## 2024-05-15 DIAGNOSIS — R60.0 LOWER EXTREMITY EDEMA: ICD-10-CM

## 2024-05-15 DIAGNOSIS — R06.02 SHORTNESS OF BREATH: ICD-10-CM

## 2024-05-15 DIAGNOSIS — R07.9 CHEST PAIN, UNSPECIFIED TYPE: Primary | ICD-10-CM

## 2024-05-15 DIAGNOSIS — R07.9 CHEST PAIN, UNSPECIFIED TYPE: ICD-10-CM

## 2024-05-15 LAB
ALBUMIN SERPL-MCNC: 4.1 G/DL (ref 3.5–5.2)
ALBUMIN/GLOB SERPL: 1.3 G/DL
ALP SERPL-CCNC: 103 U/L (ref 39–117)
ALT SERPL W P-5'-P-CCNC: 22 U/L (ref 1–33)
ANION GAP SERPL CALCULATED.3IONS-SCNC: 15.2 MMOL/L (ref 5–15)
AST SERPL-CCNC: 16 U/L (ref 1–32)
BASOPHILS # BLD AUTO: 0.05 10*3/MM3 (ref 0–0.2)
BASOPHILS NFR BLD AUTO: 0.5 % (ref 0–1.5)
BILIRUB SERPL-MCNC: 0.3 MG/DL (ref 0–1.2)
BUN SERPL-MCNC: 13 MG/DL (ref 8–23)
BUN/CREAT SERPL: 17.3 (ref 7–25)
CALCIUM SPEC-SCNC: 9.3 MG/DL (ref 8.6–10.5)
CHLORIDE SERPL-SCNC: 100 MMOL/L (ref 98–107)
CO2 SERPL-SCNC: 24.8 MMOL/L (ref 22–29)
CREAT SERPL-MCNC: 0.75 MG/DL (ref 0.57–1)
D DIMER PPP FEU-MCNC: 0.35 MCGFEU/ML (ref 0–0.61)
DEPRECATED RDW RBC AUTO: 41.8 FL (ref 37–54)
EGFRCR SERPLBLD CKD-EPI 2021: 90.7 ML/MIN/1.73
EOSINOPHIL # BLD AUTO: 0.06 10*3/MM3 (ref 0–0.4)
EOSINOPHIL NFR BLD AUTO: 0.6 % (ref 0.3–6.2)
ERYTHROCYTE [DISTWIDTH] IN BLOOD BY AUTOMATED COUNT: 12.3 % (ref 12.3–15.4)
GLOBULIN UR ELPH-MCNC: 3.1 GM/DL
GLUCOSE SERPL-MCNC: 137 MG/DL (ref 65–99)
HCT VFR BLD AUTO: 45 % (ref 34–46.6)
HGB BLD-MCNC: 15 G/DL (ref 12–15.9)
IMM GRANULOCYTES # BLD AUTO: 0.03 10*3/MM3 (ref 0–0.05)
IMM GRANULOCYTES NFR BLD AUTO: 0.3 % (ref 0–0.5)
LYMPHOCYTES # BLD AUTO: 1.67 10*3/MM3 (ref 0.7–3.1)
LYMPHOCYTES NFR BLD AUTO: 15.5 % (ref 19.6–45.3)
MCH RBC QN AUTO: 30.9 PG (ref 26.6–33)
MCHC RBC AUTO-ENTMCNC: 33.3 G/DL (ref 31.5–35.7)
MCV RBC AUTO: 92.6 FL (ref 79–97)
MONOCYTES # BLD AUTO: 0.56 10*3/MM3 (ref 0.1–0.9)
MONOCYTES NFR BLD AUTO: 5.2 % (ref 5–12)
NEUTROPHILS NFR BLD AUTO: 77.9 % (ref 42.7–76)
NEUTROPHILS NFR BLD AUTO: 8.4 10*3/MM3 (ref 1.7–7)
NRBC BLD AUTO-RTO: 0 /100 WBC (ref 0–0.2)
NT-PROBNP SERPL-MCNC: 210.3 PG/ML (ref 0–900)
PLATELET # BLD AUTO: 304 10*3/MM3 (ref 140–450)
PMV BLD AUTO: 10.4 FL (ref 6–12)
POTASSIUM SERPL-SCNC: 3.8 MMOL/L (ref 3.5–5.2)
PROT SERPL-MCNC: 7.2 G/DL (ref 6–8.5)
RBC # BLD AUTO: 4.86 10*6/MM3 (ref 3.77–5.28)
SODIUM SERPL-SCNC: 140 MMOL/L (ref 136–145)
TROPONIN T SERPL HS-MCNC: 211 NG/L
TSH SERPL DL<=0.05 MIU/L-ACNC: 3.48 UIU/ML (ref 0.27–4.2)
WBC NRBC COR # BLD AUTO: 10.77 10*3/MM3 (ref 3.4–10.8)

## 2024-05-15 PROCEDURE — 84443 ASSAY THYROID STIM HORMONE: CPT | Performed by: PHYSICIAN ASSISTANT

## 2024-05-15 PROCEDURE — 84484 ASSAY OF TROPONIN QUANT: CPT | Performed by: PHYSICIAN ASSISTANT

## 2024-05-15 PROCEDURE — 93000 ELECTROCARDIOGRAM COMPLETE: CPT | Performed by: PHYSICIAN ASSISTANT

## 2024-05-15 PROCEDURE — 85379 FIBRIN DEGRADATION QUANT: CPT | Performed by: PHYSICIAN ASSISTANT

## 2024-05-15 PROCEDURE — 36415 COLL VENOUS BLD VENIPUNCTURE: CPT

## 2024-05-15 PROCEDURE — 99214 OFFICE O/P EST MOD 30 MIN: CPT | Performed by: PHYSICIAN ASSISTANT

## 2024-05-15 PROCEDURE — 85025 COMPLETE CBC W/AUTO DIFF WBC: CPT | Performed by: PHYSICIAN ASSISTANT

## 2024-05-15 PROCEDURE — 83880 ASSAY OF NATRIURETIC PEPTIDE: CPT | Performed by: PHYSICIAN ASSISTANT

## 2024-05-15 PROCEDURE — 80053 COMPREHEN METABOLIC PANEL: CPT | Performed by: PHYSICIAN ASSISTANT

## 2024-05-15 RX ORDER — METOPROLOL SUCCINATE 25 MG/1
1 TABLET, EXTENDED RELEASE ORAL DAILY
COMMUNITY

## 2024-05-15 RX ORDER — SEMAGLUTIDE 0.68 MG/ML
0.25 INJECTION, SOLUTION SUBCUTANEOUS WEEKLY
COMMUNITY

## 2024-05-15 RX ORDER — NITROGLYCERIN 0.4 MG/1
TABLET SUBLINGUAL
Qty: 25 TABLET | Refills: 11 | Status: SHIPPED | OUTPATIENT
Start: 2024-05-15

## 2024-05-15 NOTE — PROGRESS NOTES
Problem list     Subjective   Brenda Zazueta is a 59 y.o. female     Chief Complaint   Patient presents with    Follow-up     Yearly follow up - patient came in this morning for scheduled appointment and with complaint's of active chest pain, reports onset of chest pain 1:00 am. Describes as pressure and sharp shooting type pain in left side of chest states cannot get relief, has had 3 episodes of nausea, denies any left arm, neck or jaw involvement.      Shortness of Breath     Problem list  1.  History of chest pain  1.1 regular treadmill stress test 2021 with no electrocardiographic evidence of ischemia  2.  Preserved systolic function  3.  Palpitations  3.1 event monitor 2021 with no arrhythmias identified  4.  Lower extremity edema  5.  Asthma  HPI    Patient is a 59-year-old female presenting to the office for evaluation.  She unfortunately presents with an onset of chest discomfort.    This occurred approximately 1 AM when she woke from sleep feeling a pressure and sharp pain in the lower sternal part of her chest.  She does not describe any referral.  However, she had nausea and vomiting with her chest discomfort.  She refused to go to the ER at that time.  She walked into the office today.    She describes improvement of her pain down to a 4 which was significantly lower than what she had felt hours earlier.    She has been short of breath but mild.  She does not describe progressive or severe exertional dyspnea.  No PND or orthopnea.    Patient does not palpitate nor is she complain of dysrhythmic symptoms.    She currently is sitting in active chest pain at this time.      Current Outpatient Medications on File Prior to Visit   Medication Sig Dispense Refill    ALBUTEROL IN Inhale As Needed.      ARIPiprazole (ABILIFY) 5 MG tablet 1 tablet Daily.      atomoxetine (STRATTERA) 25 MG capsule Take 1 capsule by mouth Daily.      cetirizine (zyrTEC) 10 MG tablet Take 1 tablet by mouth Daily.       cyclobenzaprine (FLEXERIL) 5 MG tablet Take 1 tablet by mouth 3 (Three) Times a Day As Needed for Muscle Spasms.      fluticasone (FLONASE) 50 MCG/ACT nasal spray 2 sprays into the nostril(s) as directed by provider Daily.      gabapentin (NEURONTIN) 300 MG capsule Take 1 capsule by mouth 2 (two) times a day.      hydrOXYzine pamoate (VISTARIL) 50 MG capsule 25 mg 3 (Three) Times a Day As Needed.      levothyroxine (SYNTHROID, LEVOTHROID) 50 MCG tablet Take  by mouth Daily.      metoprolol succinate XL (TOPROL-XL) 25 MG 24 hr tablet Take 1 tablet by mouth Daily.      Nebulizer device As Needed.      Ozempic, 0.25 or 0.5 MG/DOSE, 2 MG/3ML solution pen-injector Inject 0.25 mg under the skin into the appropriate area as directed 1 (One) Time Per Week.      triamcinolone (KENALOG) 0.1 % ointment Apply  topically to the appropriate area as directed 2 (Two) Times a Day. 1 each 1    triamterene-hydrochlorothiazide (MAXZIDE-25) 37.5-25 MG per tablet Take  by mouth Daily.      [DISCONTINUED] nitroglycerin (NITROSTAT) 0.4 MG SL tablet 1 under the tongue as needed for angina, may repeat q5mins for up three doses 25 tablet 11    atorvastatin (LIPITOR) 10 MG tablet Take 1 tablet by mouth Daily.      [DISCONTINUED] clonazePAM (KlonoPIN) 0.5 MG tablet Take  by mouth.      [DISCONTINUED] lamoTRIgine (LaMICtal) 200 MG tablet Take 1 tablet by mouth Daily.      [DISCONTINUED] meloxicam (MOBIC) 15 MG tablet Take 1 tablet by mouth Daily.      [DISCONTINUED] Trulicity 0.75 MG/0.5ML solution pen-injector 1 (One) Time Per Week. 2 inj weekly       No current facility-administered medications on file prior to visit.       Sulfa antibiotics    Past Medical History:   Diagnosis Date    Anxiety     Asthma     Bipolar 2 disorder     Diabetes mellitus     Fibromyalgia     Hypertension     Hypothyroidism     IBS (irritable bowel syndrome)     OCD (obsessive compulsive disorder)     Rheumatoid arthritis     Sleep apnea        Social History      Socioeconomic History    Marital status: Single   Tobacco Use    Smoking status: Never    Smokeless tobacco: Never   Substance and Sexual Activity    Alcohol use: Not Currently     Alcohol/week: 2.0 standard drinks of alcohol     Types: 2 Drinks containing 0.5 oz of alcohol per week     Comment: Only on special occasions    Drug use: Yes     Types: Marijuana     Comment: Occasionally    Sexual activity: Not Currently     Partners: Male     Birth control/protection: Hysterectomy       Family History   Problem Relation Age of Onset    Heart disease Mother     Hypertension Mother     Thyroid disease Mother     Lymphoma Mother     Anemia Mother     Heart attack Mother     Heart disease Father     Diabetes Father     Hypertension Father     Heart attack Father     Heart attack Brother     Hypertension Brother        Review of Systems   Constitutional: Negative.  Negative for activity change, appetite change, chills, fatigue and fever.   HENT: Negative.  Negative for congestion.    Eyes: Negative.  Negative for visual disturbance.   Respiratory:  Positive for shortness of breath and wheezing. Negative for apnea, cough and chest tightness.         Asthma   Cardiovascular:  Positive for chest pain. Negative for palpitations and leg swelling.   Gastrointestinal: Negative.  Negative for blood in stool.   Endocrine: Negative.  Negative for cold intolerance and heat intolerance.   Genitourinary: Negative.    Musculoskeletal: Negative.  Negative for gait problem.   Skin: Negative.  Negative for color change, rash and wound.   Allergic/Immunologic: Negative.  Negative for environmental allergies and food allergies.   Neurological: Negative.  Negative for dizziness, syncope, weakness, light-headedness, numbness and headaches.   Hematological: Negative.  Does not bruise/bleed easily.   Psychiatric/Behavioral: Negative.  Negative for sleep disturbance.        Objective   Vitals:    05/15/24 0830   BP: 128/86   Pulse: 69   SpO2:  "97%   Weight: 95.3 kg (210 lb)   Height: 160 cm (63\")      /86   Pulse 69   Ht 160 cm (63\")   Wt 95.3 kg (210 lb)   SpO2 97%   BMI 37.20 kg/m²     Lab Results (most recent)       None            Physical Exam  Vitals and nursing note reviewed.   Constitutional:       General: She is not in acute distress.     Appearance: Normal appearance. She is well-developed.   HENT:      Head: Normocephalic and atraumatic.   Eyes:      General: No scleral icterus.        Right eye: No discharge.         Left eye: No discharge.      Conjunctiva/sclera: Conjunctivae normal.   Neck:      Vascular: No carotid bruit.   Cardiovascular:      Rate and Rhythm: Normal rate and regular rhythm.      Heart sounds: Normal heart sounds. No murmur heard.     No friction rub. No gallop.   Pulmonary:      Effort: Pulmonary effort is normal. No respiratory distress.      Breath sounds: Normal breath sounds. No wheezing or rales.   Chest:      Chest wall: No tenderness.   Musculoskeletal:      Right lower leg: No edema.      Left lower leg: No edema.   Skin:     General: Skin is warm and dry.      Coloration: Skin is not pale.      Findings: No erythema or rash.   Neurological:      Mental Status: She is alert and oriented to person, place, and time.      Cranial Nerves: No cranial nerve deficit.   Psychiatric:         Behavior: Behavior normal.         Procedure     ECG 12 Lead    Date/Time: 5/15/2024 9:07 AM  Performed by: Ehsan Merchant PA    Authorized by: Ehsan Merchant PA  Comparison: compared with previous ECG from 5/15/2023  Comparison to previous ECG: EKG demonstrates sinus rhythm at 67 bpm, indeterminate axis, low voltage, and ST abnormalities noted mainly in the lateral leads and anterior leads             Assessment & Plan     Problems Addressed this Visit          Cardiac and Vasculature    Chest pain - Primary    Relevant Orders    Adult Transthoracic Echo Complete W/ Cont if Necessary Per Protocol    Stress Test " With Myocardial Perfusion One Day    CBC & Differential    Comprehensive Metabolic Panel    TSH    D-dimer, Quantitative    proBNP    Troponin I       Pulmonary and Pneumonias    Shortness of breath    Relevant Orders    Adult Transthoracic Echo Complete W/ Cont if Necessary Per Protocol    Stress Test With Myocardial Perfusion One Day    CBC & Differential    Comprehensive Metabolic Panel    TSH    D-dimer, Quantitative    proBNP    Troponin I       Symptoms and Signs    Lower extremity edema    Relevant Orders    Adult Transthoracic Echo Complete W/ Cont if Necessary Per Protocol    Stress Test With Myocardial Perfusion One Day    CBC & Differential    Comprehensive Metabolic Panel    TSH    D-dimer, Quantitative    proBNP    Troponin I     Diagnoses         Codes Comments    Chest pain, unspecified type    -  Primary ICD-10-CM: R07.9  ICD-9-CM: 786.50     Shortness of breath     ICD-10-CM: R06.02  ICD-9-CM: 786.05     Lower extremity edema     ICD-10-CM: R60.0  ICD-9-CM: 782.3           Recommendation  1.  Patient is a 59-year-old female presenting to the office with acute chest pain.  She has EKG changes that may not be diagnostic but are borderline and very concerning.  We discussed with the patient and are concerned about her active chest pain and that she could be having an active heart attack.  Myself and other staff members have tried to convince her to go to the emergency room but she refuses.  She is aware that she could be having a myocardial infarction and she is aware of that risk.  The risk could ultimately lead to death.  Despite multiple attempts, she refuses.  We discussed her EKG changes and expressed the fact that she could be having an active MI.  She again refuses.    2.  I am ordering laboratories but I did tell her that these laboratories will not be back until this evening and then it may be too late at that point with active chest pain.  She will not go to the hospital.  We could order the  laboratories at the hospital but she does not want to go there.    3.  I am prescribing nitroglycerin for chest pain.  I have offered her nitroglycerin here in the office but she refuses that as well.  I am very concerned and feel the patient is at high risk for a cardiac event.  She did agree that if symptoms worsen, she will go to the ER.    4.  I will order testing on this patient include an echocardiogram as long as her blood work is okay and symptoms resolve.  Stress test will be ordered for ischemia assessment.  Will order chemical testing because of symptoms and would not perform if she is having active symptoms.  These will likely be done within the next a week.    5.  We will see her back for follow-up on testing and recommend further.  Follow-up with primary as scheduled.       Patient did not bring med list or medicine bottles to appointment, med list has been reviewed and updated based on patient's knowledge of their meds.      Electronically signed by:

## 2024-05-15 NOTE — TELEPHONE ENCOUNTER
[facility] called to report a critical of Trop 211.  Repeated and verified the results.  Notified BRYSON Jaime.  Per Ehsan's recommendation pt to go to the closest ED immediately.  Called and instructed the pt to go to the ED immediately.  Inquired if the pt needs anyone called.  Pt requests for our office to call her boss Vivian @ [phone] and inform her that she's been instructed to go to the ED ASAP.  Called and told Vivian this and that I am not at liberty to share any additional details.  Faxed recent office note, labs, & EKG to [facility] .[phone].

## 2024-05-15 NOTE — LETTER
May 15, 2024       No Recipients    Patient: Brenda Zazueta   YOB: 1965   Date of Visit: 5/15/2024       Dear Darryl Carmichael MD    Brenda Zazueta was in my office today. Below is a copy of my note.    If you have questions, please do not hesitate to call me. I look forward to following Brenda along with you.         Sincerely,        BRYSON Snowden        CC:   No Recipients    Problem list     Subjective  Brenda Zazueta is a 59 y.o. female     Chief Complaint   Patient presents with   • Follow-up     Yearly follow up - patient came in this morning for scheduled appointment and with complaint's of active chest pain, reports onset of chest pain 1:00 am. Describes as pressure and sharp shooting type pain in left side of chest states cannot get relief, has had 3 episodes of nausea, denies any left arm, neck or jaw involvement.     • Shortness of Breath     Problem list  1.  History of chest pain  1.1 regular treadmill stress test 2021 with no electrocardiographic evidence of ischemia  2.  Preserved systolic function  3.  Palpitations  3.1 event monitor 2021 with no arrhythmias identified  4.  Lower extremity edema  5.  Asthma  HPI    Patient is a 59-year-old female presenting to the office for evaluation.  She unfortunately presents with an onset of chest discomfort.    This occurred approximately 1 AM when she woke from sleep feeling a pressure and sharp pain in the lower sternal part of her chest.  She does not describe any referral.  However, she had nausea and vomiting with her chest discomfort.  She refused to go to the ER at that time.  She walked into the office today.    She describes improvement of her pain down to a 4 which was significantly lower than what she had felt hours earlier.    She has been short of breath but mild.  She does not describe progressive or severe exertional dyspnea.  No PND or orthopnea.    Patient does not palpitate nor is she complain of dysrhythmic  symptoms.    She currently is sitting in active chest pain at this time.      Current Outpatient Medications on File Prior to Visit   Medication Sig Dispense Refill   • ALBUTEROL IN Inhale As Needed.     • ARIPiprazole (ABILIFY) 5 MG tablet 1 tablet Daily.     • atomoxetine (STRATTERA) 25 MG capsule Take 1 capsule by mouth Daily.     • cetirizine (zyrTEC) 10 MG tablet Take 1 tablet by mouth Daily.     • cyclobenzaprine (FLEXERIL) 5 MG tablet Take 1 tablet by mouth 3 (Three) Times a Day As Needed for Muscle Spasms.     • fluticasone (FLONASE) 50 MCG/ACT nasal spray 2 sprays into the nostril(s) as directed by provider Daily.     • gabapentin (NEURONTIN) 300 MG capsule Take 1 capsule by mouth 2 (two) times a day.     • hydrOXYzine pamoate (VISTARIL) 50 MG capsule 25 mg 3 (Three) Times a Day As Needed.     • levothyroxine (SYNTHROID, LEVOTHROID) 50 MCG tablet Take  by mouth Daily.     • metoprolol succinate XL (TOPROL-XL) 25 MG 24 hr tablet Take 1 tablet by mouth Daily.     • Nebulizer device As Needed.     • Ozempic, 0.25 or 0.5 MG/DOSE, 2 MG/3ML solution pen-injector Inject 0.25 mg under the skin into the appropriate area as directed 1 (One) Time Per Week.     • triamcinolone (KENALOG) 0.1 % ointment Apply  topically to the appropriate area as directed 2 (Two) Times a Day. 1 each 1   • triamterene-hydrochlorothiazide (MAXZIDE-25) 37.5-25 MG per tablet Take  by mouth Daily.     • [DISCONTINUED] nitroglycerin (NITROSTAT) 0.4 MG SL tablet 1 under the tongue as needed for angina, may repeat q5mins for up three doses 25 tablet 11   • atorvastatin (LIPITOR) 10 MG tablet Take 1 tablet by mouth Daily.     • [DISCONTINUED] clonazePAM (KlonoPIN) 0.5 MG tablet Take  by mouth.     • [DISCONTINUED] lamoTRIgine (LaMICtal) 200 MG tablet Take 1 tablet by mouth Daily.     • [DISCONTINUED] meloxicam (MOBIC) 15 MG tablet Take 1 tablet by mouth Daily.     • [DISCONTINUED] Trulicity 0.75 MG/0.5ML solution pen-injector 1 (One) Time Per  Week. 2 inj weekly       No current facility-administered medications on file prior to visit.       Sulfa antibiotics    Past Medical History:   Diagnosis Date   • Anxiety    • Asthma    • Bipolar 2 disorder    • Diabetes mellitus    • Fibromyalgia    • Hypertension    • Hypothyroidism    • IBS (irritable bowel syndrome)    • OCD (obsessive compulsive disorder)    • Rheumatoid arthritis    • Sleep apnea        Social History     Socioeconomic History   • Marital status: Single   Tobacco Use   • Smoking status: Never   • Smokeless tobacco: Never   Substance and Sexual Activity   • Alcohol use: Not Currently     Alcohol/week: 2.0 standard drinks of alcohol     Types: 2 Drinks containing 0.5 oz of alcohol per week     Comment: Only on special occasions   • Drug use: Yes     Types: Marijuana     Comment: Occasionally   • Sexual activity: Not Currently     Partners: Male     Birth control/protection: Hysterectomy       Family History   Problem Relation Age of Onset   • Heart disease Mother    • Hypertension Mother    • Thyroid disease Mother    • Lymphoma Mother    • Anemia Mother    • Heart attack Mother    • Heart disease Father    • Diabetes Father    • Hypertension Father    • Heart attack Father    • Heart attack Brother    • Hypertension Brother        Review of Systems   Constitutional: Negative.  Negative for activity change, appetite change, chills, fatigue and fever.   HENT: Negative.  Negative for congestion.    Eyes: Negative.  Negative for visual disturbance.   Respiratory:  Positive for shortness of breath and wheezing. Negative for apnea, cough and chest tightness.         Asthma   Cardiovascular:  Positive for chest pain. Negative for palpitations and leg swelling.   Gastrointestinal: Negative.  Negative for blood in stool.   Endocrine: Negative.  Negative for cold intolerance and heat intolerance.   Genitourinary: Negative.    Musculoskeletal: Negative.  Negative for gait problem.   Skin: Negative.   "Negative for color change, rash and wound.   Allergic/Immunologic: Negative.  Negative for environmental allergies and food allergies.   Neurological: Negative.  Negative for dizziness, syncope, weakness, light-headedness, numbness and headaches.   Hematological: Negative.  Does not bruise/bleed easily.   Psychiatric/Behavioral: Negative.  Negative for sleep disturbance.        Objective  Vitals:    05/15/24 0830   BP: 128/86   Pulse: 69   SpO2: 97%   Weight: 95.3 kg (210 lb)   Height: 160 cm (63\")      /86   Pulse 69   Ht 160 cm (63\")   Wt 95.3 kg (210 lb)   SpO2 97%   BMI 37.20 kg/m²     Lab Results (most recent)       None            Physical Exam  Vitals and nursing note reviewed.   Constitutional:       General: She is not in acute distress.     Appearance: Normal appearance. She is well-developed.   HENT:      Head: Normocephalic and atraumatic.   Eyes:      General: No scleral icterus.        Right eye: No discharge.         Left eye: No discharge.      Conjunctiva/sclera: Conjunctivae normal.   Neck:      Vascular: No carotid bruit.   Cardiovascular:      Rate and Rhythm: Normal rate and regular rhythm.      Heart sounds: Normal heart sounds. No murmur heard.     No friction rub. No gallop.   Pulmonary:      Effort: Pulmonary effort is normal. No respiratory distress.      Breath sounds: Normal breath sounds. No wheezing or rales.   Chest:      Chest wall: No tenderness.   Musculoskeletal:      Right lower leg: No edema.      Left lower leg: No edema.   Skin:     General: Skin is warm and dry.      Coloration: Skin is not pale.      Findings: No erythema or rash.   Neurological:      Mental Status: She is alert and oriented to person, place, and time.      Cranial Nerves: No cranial nerve deficit.   Psychiatric:         Behavior: Behavior normal.         Procedure    ECG 12 Lead    Date/Time: 5/15/2024 9:07 AM  Performed by: Ehsan Merchant PA    Authorized by: Ehsan Merchant PA  " Comparison: compared with previous ECG from 5/15/2023  Comparison to previous ECG: EKG demonstrates sinus rhythm at 67 bpm, indeterminate axis, low voltage, and ST abnormalities noted mainly in the lateral leads and anterior leads             Assessment & Plan    Problems Addressed this Visit          Cardiac and Vasculature    Chest pain - Primary    Relevant Orders    Adult Transthoracic Echo Complete W/ Cont if Necessary Per Protocol    Stress Test With Myocardial Perfusion One Day    CBC & Differential    Comprehensive Metabolic Panel    TSH    D-dimer, Quantitative    proBNP    Troponin I       Pulmonary and Pneumonias    Shortness of breath    Relevant Orders    Adult Transthoracic Echo Complete W/ Cont if Necessary Per Protocol    Stress Test With Myocardial Perfusion One Day    CBC & Differential    Comprehensive Metabolic Panel    TSH    D-dimer, Quantitative    proBNP    Troponin I       Symptoms and Signs    Lower extremity edema    Relevant Orders    Adult Transthoracic Echo Complete W/ Cont if Necessary Per Protocol    Stress Test With Myocardial Perfusion One Day    CBC & Differential    Comprehensive Metabolic Panel    TSH    D-dimer, Quantitative    proBNP    Troponin I     Diagnoses         Codes Comments    Chest pain, unspecified type    -  Primary ICD-10-CM: R07.9  ICD-9-CM: 786.50     Shortness of breath     ICD-10-CM: R06.02  ICD-9-CM: 786.05     Lower extremity edema     ICD-10-CM: R60.0  ICD-9-CM: 782.3           Recommendation  1.  Patient is a 59-year-old female presenting to the office with acute chest pain.  She has EKG changes that may not be diagnostic but are borderline and very concerning.  We discussed with the patient and are concerned about her active chest pain and that she could be having an active heart attack.  Myself and other staff members have tried to convince her to go to the emergency room but she refuses.  She is aware that she could be having a myocardial infarction and  she is aware of that risk.  The risk could ultimately lead to death.  Despite multiple attempts, she refuses.  We discussed her EKG changes and expressed the fact that she could be having an active MI.  She again refuses.    2.  I am ordering laboratories but I did tell her that these laboratories will not be back until this evening and then it may be too late at that point with active chest pain.  She will not go to the hospital.  We could order the laboratories at the hospital but she does not want to go there.    3.  I am prescribing nitroglycerin for chest pain.  I have offered her nitroglycerin here in the office but she refuses that as well.  I am very concerned and feel the patient is at high risk for a cardiac event.  She did agree that if symptoms worsen, she will go to the ER.    4.  I will order testing on this patient include an echocardiogram as long as her blood work is okay and symptoms resolve.  Stress test will be ordered for ischemia assessment.  Will order chemical testing because of symptoms and would not perform if she is having active symptoms.  These will likely be done within the next a week.    5.  We will see her back for follow-up on testing and recommend further.  Follow-up with primary as scheduled.       Patient did not bring med list or medicine bottles to appointment, med list has been reviewed and updated based on patient's knowledge of their meds.      Electronically signed by:

## 2024-05-20 ENCOUNTER — TELEPHONE (OUTPATIENT)
Dept: CARDIOLOGY | Facility: CLINIC | Age: 59
End: 2024-05-20

## 2024-05-20 NOTE — TELEPHONE ENCOUNTER
Caller: AWAIS    Relationship to patient: Provider    Best call back number: [phone]     New or established patient?  [] New  [x] Established    Date of discharge: 05.17.24    Facility discharged from: [facility]    Diagnosis/Symptoms: NSTEMI    Length of stay (If applicable): 05.15.24-05.17.24     REQUESTING 2 WEEK FOLLOW UP.

## 2024-05-21 ENCOUNTER — OFFICE VISIT (OUTPATIENT)
Dept: CARDIOLOGY | Facility: CLINIC | Age: 59
End: 2024-05-21
Payer: COMMERCIAL

## 2024-05-21 VITALS
HEART RATE: 76 BPM | DIASTOLIC BLOOD PRESSURE: 93 MMHG | SYSTOLIC BLOOD PRESSURE: 179 MMHG | BODY MASS INDEX: 36.86 KG/M2 | WEIGHT: 208 LBS | OXYGEN SATURATION: 94 % | HEIGHT: 63 IN

## 2024-05-21 DIAGNOSIS — R07.9 CHEST PAIN, UNSPECIFIED TYPE: Primary | ICD-10-CM

## 2024-05-21 DIAGNOSIS — R06.02 SHORTNESS OF BREATH: ICD-10-CM

## 2024-05-21 DIAGNOSIS — I10 PRIMARY HYPERTENSION: ICD-10-CM

## 2024-05-21 DIAGNOSIS — I31.39 PERICARDIAL EFFUSION: ICD-10-CM

## 2024-05-21 PROCEDURE — 99214 OFFICE O/P EST MOD 30 MIN: CPT | Performed by: PHYSICIAN ASSISTANT

## 2024-05-21 RX ORDER — COLCHICINE 0.6 MG/1
0.6 TABLET ORAL 2 TIMES DAILY
COMMUNITY

## 2024-05-21 RX ORDER — DAPAGLIFLOZIN 10 MG/1
TABLET, FILM COATED ORAL
COMMUNITY

## 2024-05-21 RX ORDER — IBUPROFEN 600 MG/1
600 TABLET ORAL 3 TIMES DAILY
COMMUNITY

## 2024-05-21 RX ORDER — ASPIRIN 81 MG/1
81 TABLET ORAL DAILY
COMMUNITY

## 2024-05-21 RX ORDER — OMEPRAZOLE 20 MG/1
20 CAPSULE, DELAYED RELEASE ORAL DAILY
COMMUNITY

## 2024-05-21 RX ORDER — SPIRONOLACTONE 25 MG/1
25 TABLET ORAL DAILY
COMMUNITY

## 2024-05-21 RX ORDER — HYDROCODONE BITARTRATE AND ACETAMINOPHEN 7.5; 325 MG/1; MG/1
1 TABLET ORAL EVERY 6 HOURS PRN
COMMUNITY

## 2024-05-21 RX ORDER — HYDROXYZINE HYDROCHLORIDE 25 MG/1
25 TABLET, FILM COATED ORAL 3 TIMES DAILY PRN
COMMUNITY

## 2024-05-21 NOTE — PROGRESS NOTES
Problem list     Subjective   Brenda Zazueta is a 59 y.o. female     Chief Complaint   Patient presents with    Hospital Follow Up Visit     Problem list  1.  Nonobstructive coronary artery disease  1.1 regular treadmill stress test 2021 with no electrocardiographic evidence of ischemia  1.2 non-ST elevation myocardial infarction May 2024 dilated cardiomyopathy  1.3 cardiac catheterization May 2024 with approximately 20% disease involving the LAD with no significant disease otherwise dilated cardiomyopathy  2.  Dilated cardiomyopathy  2.1 EF estimated at 35 to 40% by catheterization and 40 to 45% by echocardiogram May 2024  3.  Palpitations  3.1 event monitor 2021 with no arrhythmias identified  4.  Lower extremity edema  5.  Asthma  6.  Pericardial effusion  6.1 moderate pericardial effusion by echocardiogram May 2024    HPI    Patient is a 59-year-old female presenting back to the office for follow-up.  Last visit, she was complaining of chest pain.  She woke up that morning with chest discomfort with nausea and vomiting.  She did not go to the hospital represented to the office.  She has some subtle EKG abnormalities.  She had active pain and we instructed her to go to the ER.  She refused.  We will draw blood work on her and her troponin was elevated.  She was then agreeable to go to the ER.    She was admitted and underwent catheterization with no appreciable coronary disease to cause symptoms.  She had 20% involving the LAD.  She had evidence of possible Takotsubo's cardiomyopathy versus a dilated cardiomyopathy.  It was also noteworthy that she had a moderate pericardial effusion.    Patient was placed on medications and eventually discharged.  She is feeling better.  She does not describe any pain or pressure.  She has been placed on medication to treat cardiomyopathy and even pericarditis with colchicine.    She has a degree of dyspnea but nothing that has been progressive.  She describes some fatigue or  weakness.  She does not describe PND or orthopnea.  No complaints of progressive edema.    She may occasionally palpitate or sense a flutter sensation.  It is mild.  She does not describe any symptoms to suggest malignant arrhythmia.  No complaints of dizziness, presyncope, or syncope.  She is stable otherwise.    Current Outpatient Medications on File Prior to Visit   Medication Sig Dispense Refill    ALBUTEROL IN Inhale As Needed.      ARIPiprazole (ABILIFY) 5 MG tablet 1 tablet Daily.      aspirin 81 MG EC tablet Take 1 tablet by mouth Daily.      atomoxetine (STRATTERA) 40 MG capsule Take 1 capsule by mouth Daily.      atorvastatin (LIPITOR) 40 MG tablet Take 1 tablet by mouth Daily.      colchicine 0.6 MG tablet Take 1 tablet by mouth 2 (Two) Times a Day.      cyclobenzaprine (FLEXERIL) 10 MG tablet Take 1 tablet by mouth 3 (Three) Times a Day As Needed for Muscle Spasms.      dapagliflozin Propanediol (Farxiga) 10 MG tablet Take  by mouth.      fluticasone (FLONASE) 50 MCG/ACT nasal spray 2 sprays into the nostril(s) as directed by provider Daily.      gabapentin (NEURONTIN) 300 MG capsule Take 1 capsule by mouth 2 (two) times a day.      HYDROcodone-acetaminophen (NORCO) 7.5-325 MG per tablet Take 1 tablet by mouth Every 6 (Six) Hours As Needed for Moderate Pain.      hydrOXYzine (ATARAX) 25 MG tablet Take 1 tablet by mouth 3 (Three) Times a Day As Needed for Itching.      hydrOXYzine pamoate (VISTARIL) 50 MG capsule 25 mg 3 (Three) Times a Day As Needed.      ibuprofen (ADVIL,MOTRIN) 600 MG tablet Take 1 tablet by mouth 3 (Three) Times a Day.      levothyroxine (SYNTHROID, LEVOTHROID) 50 MCG tablet Take  by mouth Daily.      metoprolol succinate XL (TOPROL-XL) 25 MG 24 hr tablet Take 1 tablet by mouth Daily.      Nebulizer device As Needed.      nitroglycerin (NITROSTAT) 0.4 MG SL tablet 1 under the tongue as needed for angina, may repeat q5mins for up three doses 25 tablet 11    omeprazole (priLOSEC) 20 MG  capsule Take 1 capsule by mouth Daily.      Ozempic, 0.25 or 0.5 MG/DOSE, 2 MG/3ML solution pen-injector Inject 0.25 mg under the skin into the appropriate area as directed 1 (One) Time Per Week.      spironolactone (ALDACTONE) 25 MG tablet Take 1 tablet by mouth Daily.      triamcinolone (KENALOG) 0.1 % ointment Apply  topically to the appropriate area as directed 2 (Two) Times a Day. 1 each 1    [DISCONTINUED] triamterene-hydrochlorothiazide (MAXZIDE-25) 37.5-25 MG per tablet Take  by mouth Daily.      cetirizine (zyrTEC) 10 MG tablet Take 1 tablet by mouth Daily.       No current facility-administered medications on file prior to visit.       Sulfa antibiotics    Past Medical History:   Diagnosis Date    Anxiety     Asthma     Bipolar 2 disorder     Diabetes mellitus     Fibromyalgia     Hypertension     Hypothyroidism     IBS (irritable bowel syndrome)     OCD (obsessive compulsive disorder)     Rheumatoid arthritis     Sleep apnea        Social History     Socioeconomic History    Marital status: Single   Tobacco Use    Smoking status: Never    Smokeless tobacco: Never   Substance and Sexual Activity    Alcohol use: Not Currently     Alcohol/week: 2.0 standard drinks of alcohol     Types: 2 Drinks containing 0.5 oz of alcohol per week     Comment: Only on special occasions    Drug use: Yes     Types: Marijuana     Comment: Occasionally    Sexual activity: Not Currently     Partners: Male     Birth control/protection: Hysterectomy       Family History   Problem Relation Age of Onset    Heart disease Mother     Hypertension Mother     Thyroid disease Mother     Lymphoma Mother     Anemia Mother     Heart attack Mother     Heart disease Father     Diabetes Father     Hypertension Father     Heart attack Father     Heart attack Brother     Hypertension Brother        Review of Systems   Constitutional: Negative.  Negative for activity change, appetite change, chills, fatigue and fever.   HENT: Negative.  Negative  "for congestion, sinus pressure and sinus pain.    Eyes: Negative.  Negative for visual disturbance.   Respiratory:  Positive for apnea, shortness of breath and wheezing. Negative for cough and chest tightness.    Cardiovascular:  Positive for chest pain and palpitations. Negative for leg swelling.   Gastrointestinal: Negative.  Negative for blood in stool.   Endocrine: Negative.  Negative for cold intolerance and heat intolerance.   Genitourinary: Negative.  Negative for hematuria.   Musculoskeletal: Negative.  Negative for gait problem.   Skin: Negative.  Negative for color change, rash and wound.   Allergic/Immunologic: Negative.  Negative for environmental allergies and food allergies.   Neurological: Negative.  Negative for dizziness, syncope, weakness, light-headedness, numbness and headaches.   Hematological:  Bruises/bleeds easily.   Psychiatric/Behavioral: Negative.  Negative for sleep disturbance.        Objective   Vitals:    05/21/24 1319   BP: 179/93   BP Location: Right arm   Patient Position: Sitting   Cuff Size: Adult   Pulse: 76   SpO2: 94%   Weight: 94.3 kg (208 lb)   Height: 160 cm (63\")      /93 (BP Location: Right arm, Patient Position: Sitting, Cuff Size: Adult)   Pulse 76   Ht 160 cm (63\")   Wt 94.3 kg (208 lb)   SpO2 94%   BMI 36.85 kg/m²     Lab Results (most recent)       None            Physical Exam  Vitals and nursing note reviewed.   Constitutional:       General: She is not in acute distress.     Appearance: Normal appearance. She is well-developed.   HENT:      Head: Normocephalic and atraumatic.   Eyes:      General: No scleral icterus.        Right eye: No discharge.         Left eye: No discharge.      Conjunctiva/sclera: Conjunctivae normal.   Neck:      Vascular: No carotid bruit.   Cardiovascular:      Rate and Rhythm: Normal rate and regular rhythm.      Heart sounds: Normal heart sounds. No murmur heard.     No friction rub. No gallop.   Pulmonary:      Effort: " Pulmonary effort is normal. No respiratory distress.      Breath sounds: Normal breath sounds. No wheezing or rales.   Chest:      Chest wall: No tenderness.   Musculoskeletal:      Right lower leg: No edema.      Left lower leg: No edema.   Skin:     General: Skin is warm and dry.      Coloration: Skin is not pale.      Findings: No erythema or rash.   Neurological:      Mental Status: She is alert and oriented to person, place, and time.      Cranial Nerves: No cranial nerve deficit.   Psychiatric:         Behavior: Behavior normal.         Procedure   Procedures       Assessment & Plan     Problems Addressed this Visit          Cardiac and Vasculature    Chest pain - Primary    Relevant Orders    Adult Transthoracic Echo Limited W/ Cont if Necessary Per Protocol    Primary hypertension    Relevant Medications    spironolactone (ALDACTONE) 25 MG tablet    Other Relevant Orders    Adult Transthoracic Echo Limited W/ Cont if Necessary Per Protocol    Pericardial effusion    Relevant Orders    Adult Transthoracic Echo Limited W/ Cont if Necessary Per Protocol       Pulmonary and Pneumonias    Shortness of breath    Relevant Orders    Adult Transthoracic Echo Limited W/ Cont if Necessary Per Protocol     Diagnoses         Codes Comments    Chest pain, unspecified type    -  Primary ICD-10-CM: R07.9  ICD-9-CM: 786.50     Shortness of breath     ICD-10-CM: R06.02  ICD-9-CM: 786.05     Primary hypertension     ICD-10-CM: I10  ICD-9-CM: 401.9     Pericardial effusion     ICD-10-CM: I31.39  ICD-9-CM: 423.9             Recommendations  1.  Patient is a 59-year-old female that is doing well post catheterization and hospitalization.  I am adding Entresto to her regimen for guideline directed medical therapy.  I want her to keep a blood pressure log and call back in 1 week.  Her blood pressure is significantly elevated today and hopefully with adjustments, this will improve.  However, she describes it running much better  outside the office.  We will wait on blood pressure readings.    2.  Patient with pericardial effusion noted.  I spoke with Dr. Carrasco who recommended an echocardiogram in 2 weeks.  We are going to try to schedule for next week.She does not describe symptoms to suggest tamponade.    3.  Otherwise, she is feeling better.  Chest pain is improved.  We are going to repeat echocardiogram.  We will continue on the current medical regimen.  If symptoms were to worsen, she was instructed to call the office.    4.  We will see her back for follow-up in approximately 1 month or sooner if needed.  Follow-up with primary as scheduled.       Patient did not bring med list or medicine bottles to appointment, med list has been reviewed and updated based on patient's knowledge of their meds.            Electronically signed by:

## 2024-05-21 NOTE — LETTER
May 21, 2024       No Recipients    Patient: Brenda Zazueta   YOB: 1965   Date of Visit: 5/21/2024       Dear Darryl Carmichael MD    Brenda Zazueta was in my office today. Below is a copy of my note.    If you have questions, please do not hesitate to call me. I look forward to following Brenda along with you.         Sincerely,        BRYSON Snowden        CC:   No Recipients    Problem list     Subjective  Brenda Zazueta is a 59 y.o. female     Chief Complaint   Patient presents with   • Hospital Follow Up Visit     Problem list  1.  Nonobstructive coronary artery disease  1.1 regular treadmill stress test 2021 with no electrocardiographic evidence of ischemia  1.2 non-ST elevation myocardial infarction May 2024 dilated cardiomyopathy  1.3 cardiac catheterization May 2024 with approximately 20% disease involving the LAD with no significant disease otherwise dilated cardiomyopathy  2.  Dilated cardiomyopathy  2.1 EF estimated at 35 to 40% by catheterization and 40 to 45% by echocardiogram May 2024  3.  Palpitations  3.1 event monitor 2021 with no arrhythmias identified  4.  Lower extremity edema  5.  Asthma  6.  Pericardial effusion  6.1 moderate pericardial effusion by echocardiogram May 2024    HPI    Patient is a 59-year-old female presenting back to the office for follow-up.  Last visit, she was complaining of chest pain.  She woke up that morning with chest discomfort with nausea and vomiting.  She did not go to the hospital represented to the office.  She has some subtle EKG abnormalities.  She had active pain and we instructed her to go to the ER.  She refused.  We will draw blood work on her and her troponin was elevated.  She was then agreeable to go to the ER.    She was admitted and underwent catheterization with no appreciable coronary disease to cause symptoms.  She had 20% involving the LAD.  She had evidence of possible Takotsubo's cardiomyopathy versus a dilated  cardiomyopathy.  It was also noteworthy that she had a moderate pericardial effusion.    Patient was placed on medications and eventually discharged.  She is feeling better.  She does not describe any pain or pressure.  She has been placed on medication to treat cardiomyopathy and even pericarditis with colchicine.    She has a degree of dyspnea but nothing that has been progressive.  She describes some fatigue or weakness.  She does not describe PND or orthopnea.  No complaints of progressive edema.    She may occasionally palpitate or sense a flutter sensation.  It is mild.  She does not describe any symptoms to suggest malignant arrhythmia.  No complaints of dizziness, presyncope, or syncope.  She is stable otherwise.    Current Outpatient Medications on File Prior to Visit   Medication Sig Dispense Refill   • ALBUTEROL IN Inhale As Needed.     • ARIPiprazole (ABILIFY) 5 MG tablet 1 tablet Daily.     • aspirin 81 MG EC tablet Take 1 tablet by mouth Daily.     • atomoxetine (STRATTERA) 40 MG capsule Take 1 capsule by mouth Daily.     • atorvastatin (LIPITOR) 40 MG tablet Take 1 tablet by mouth Daily.     • colchicine 0.6 MG tablet Take 1 tablet by mouth 2 (Two) Times a Day.     • cyclobenzaprine (FLEXERIL) 10 MG tablet Take 1 tablet by mouth 3 (Three) Times a Day As Needed for Muscle Spasms.     • dapagliflozin Propanediol (Farxiga) 10 MG tablet Take  by mouth.     • fluticasone (FLONASE) 50 MCG/ACT nasal spray 2 sprays into the nostril(s) as directed by provider Daily.     • gabapentin (NEURONTIN) 300 MG capsule Take 1 capsule by mouth 2 (two) times a day.     • HYDROcodone-acetaminophen (NORCO) 7.5-325 MG per tablet Take 1 tablet by mouth Every 6 (Six) Hours As Needed for Moderate Pain.     • hydrOXYzine (ATARAX) 25 MG tablet Take 1 tablet by mouth 3 (Three) Times a Day As Needed for Itching.     • hydrOXYzine pamoate (VISTARIL) 50 MG capsule 25 mg 3 (Three) Times a Day As Needed.     • ibuprofen (ADVIL,MOTRIN)  600 MG tablet Take 1 tablet by mouth 3 (Three) Times a Day.     • levothyroxine (SYNTHROID, LEVOTHROID) 50 MCG tablet Take  by mouth Daily.     • metoprolol succinate XL (TOPROL-XL) 25 MG 24 hr tablet Take 1 tablet by mouth Daily.     • Nebulizer device As Needed.     • nitroglycerin (NITROSTAT) 0.4 MG SL tablet 1 under the tongue as needed for angina, may repeat q5mins for up three doses 25 tablet 11   • omeprazole (priLOSEC) 20 MG capsule Take 1 capsule by mouth Daily.     • Ozempic, 0.25 or 0.5 MG/DOSE, 2 MG/3ML solution pen-injector Inject 0.25 mg under the skin into the appropriate area as directed 1 (One) Time Per Week.     • spironolactone (ALDACTONE) 25 MG tablet Take 1 tablet by mouth Daily.     • triamcinolone (KENALOG) 0.1 % ointment Apply  topically to the appropriate area as directed 2 (Two) Times a Day. 1 each 1   • [DISCONTINUED] triamterene-hydrochlorothiazide (MAXZIDE-25) 37.5-25 MG per tablet Take  by mouth Daily.     • cetirizine (zyrTEC) 10 MG tablet Take 1 tablet by mouth Daily.       No current facility-administered medications on file prior to visit.       Sulfa antibiotics    Past Medical History:   Diagnosis Date   • Anxiety    • Asthma    • Bipolar 2 disorder    • Diabetes mellitus    • Fibromyalgia    • Hypertension    • Hypothyroidism    • IBS (irritable bowel syndrome)    • OCD (obsessive compulsive disorder)    • Rheumatoid arthritis    • Sleep apnea        Social History     Socioeconomic History   • Marital status: Single   Tobacco Use   • Smoking status: Never   • Smokeless tobacco: Never   Substance and Sexual Activity   • Alcohol use: Not Currently     Alcohol/week: 2.0 standard drinks of alcohol     Types: 2 Drinks containing 0.5 oz of alcohol per week     Comment: Only on special occasions   • Drug use: Yes     Types: Marijuana     Comment: Occasionally   • Sexual activity: Not Currently     Partners: Male     Birth control/protection: Hysterectomy       Family History   Problem  "Relation Age of Onset   • Heart disease Mother    • Hypertension Mother    • Thyroid disease Mother    • Lymphoma Mother    • Anemia Mother    • Heart attack Mother    • Heart disease Father    • Diabetes Father    • Hypertension Father    • Heart attack Father    • Heart attack Brother    • Hypertension Brother        Review of Systems   Constitutional: Negative.  Negative for activity change, appetite change, chills, fatigue and fever.   HENT: Negative.  Negative for congestion, sinus pressure and sinus pain.    Eyes: Negative.  Negative for visual disturbance.   Respiratory:  Positive for apnea, shortness of breath and wheezing. Negative for cough and chest tightness.    Cardiovascular:  Positive for chest pain and palpitations. Negative for leg swelling.   Gastrointestinal: Negative.  Negative for blood in stool.   Endocrine: Negative.  Negative for cold intolerance and heat intolerance.   Genitourinary: Negative.  Negative for hematuria.   Musculoskeletal: Negative.  Negative for gait problem.   Skin: Negative.  Negative for color change, rash and wound.   Allergic/Immunologic: Negative.  Negative for environmental allergies and food allergies.   Neurological: Negative.  Negative for dizziness, syncope, weakness, light-headedness, numbness and headaches.   Hematological:  Bruises/bleeds easily.   Psychiatric/Behavioral: Negative.  Negative for sleep disturbance.        Objective  Vitals:    05/21/24 1319   BP: 179/93   BP Location: Right arm   Patient Position: Sitting   Cuff Size: Adult   Pulse: 76   SpO2: 94%   Weight: 94.3 kg (208 lb)   Height: 160 cm (63\")      /93 (BP Location: Right arm, Patient Position: Sitting, Cuff Size: Adult)   Pulse 76   Ht 160 cm (63\")   Wt 94.3 kg (208 lb)   SpO2 94%   BMI 36.85 kg/m²     Lab Results (most recent)       None            Physical Exam  Vitals and nursing note reviewed.   Constitutional:       General: She is not in acute distress.     Appearance: Normal " appearance. She is well-developed.   HENT:      Head: Normocephalic and atraumatic.   Eyes:      General: No scleral icterus.        Right eye: No discharge.         Left eye: No discharge.      Conjunctiva/sclera: Conjunctivae normal.   Neck:      Vascular: No carotid bruit.   Cardiovascular:      Rate and Rhythm: Normal rate and regular rhythm.      Heart sounds: Normal heart sounds. No murmur heard.     No friction rub. No gallop.   Pulmonary:      Effort: Pulmonary effort is normal. No respiratory distress.      Breath sounds: Normal breath sounds. No wheezing or rales.   Chest:      Chest wall: No tenderness.   Musculoskeletal:      Right lower leg: No edema.      Left lower leg: No edema.   Skin:     General: Skin is warm and dry.      Coloration: Skin is not pale.      Findings: No erythema or rash.   Neurological:      Mental Status: She is alert and oriented to person, place, and time.      Cranial Nerves: No cranial nerve deficit.   Psychiatric:         Behavior: Behavior normal.         Procedure  Procedures       Assessment & Plan    Problems Addressed this Visit          Cardiac and Vasculature    Chest pain - Primary    Relevant Orders    Adult Transthoracic Echo Limited W/ Cont if Necessary Per Protocol    Primary hypertension    Relevant Medications    spironolactone (ALDACTONE) 25 MG tablet    Other Relevant Orders    Adult Transthoracic Echo Limited W/ Cont if Necessary Per Protocol    Pericardial effusion    Relevant Orders    Adult Transthoracic Echo Limited W/ Cont if Necessary Per Protocol       Pulmonary and Pneumonias    Shortness of breath    Relevant Orders    Adult Transthoracic Echo Limited W/ Cont if Necessary Per Protocol     Diagnoses         Codes Comments    Chest pain, unspecified type    -  Primary ICD-10-CM: R07.9  ICD-9-CM: 786.50     Shortness of breath     ICD-10-CM: R06.02  ICD-9-CM: 786.05     Primary hypertension     ICD-10-CM: I10  ICD-9-CM: 401.9     Pericardial effusion      ICD-10-CM: I31.39  ICD-9-CM: 423.9             Recommendations  1.  Patient is a 59-year-old female that is doing well post catheterization and hospitalization.  I am adding Entresto to her regimen for guideline directed medical therapy.  I want her to keep a blood pressure log and call back in 1 week.  Her blood pressure is significantly elevated today and hopefully with adjustments, this will improve.  However, she describes it running much better outside the office.  We will wait on blood pressure readings.    2.  Patient with pericardial effusion noted.  I spoke with Dr. Carrasco who recommended an echocardiogram in 2 weeks.  We are going to try to schedule for next week.She does not describe symptoms to suggest tamponade.    3.  Otherwise, she is feeling better.  Chest pain is improved.  We are going to repeat echocardiogram.  We will continue on the current medical regimen.  If symptoms were to worsen, she was instructed to call the office.    4.  We will see her back for follow-up in approximately 1 month or sooner if needed.  Follow-up with primary as scheduled.       Patient did not bring med list or medicine bottles to appointment, med list has been reviewed and updated based on patient's knowledge of their meds.            Electronically signed by:

## 2024-05-29 ENCOUNTER — TELEPHONE (OUTPATIENT)
Dept: CARDIOLOGY | Facility: CLINIC | Age: 59
End: 2024-05-29
Payer: COMMERCIAL

## 2024-05-29 NOTE — TELEPHONE ENCOUNTER
Patient called in regards to the testing she is scheduled for on Friday at our office. She was wanting to make sure insurance had approved it. I referenced the referral and seen both visits have been authorized. I call patient and she verbalized understanding.

## 2024-06-11 ENCOUNTER — TELEPHONE (OUTPATIENT)
Dept: CARDIOLOGY | Facility: CLINIC | Age: 59
End: 2024-06-11
Payer: COMMERCIAL

## 2024-06-11 NOTE — TELEPHONE ENCOUNTER
Medical Records request received from [facility].  Due to this office not being listed as the pt's PCP; called the pt to verify if she changed PCP's and if our office needs to fax the requested records.  Per the pt, she does not want her records released to this office.    Pt inquired if she needs to continue taking Strattera with her recent cardiac issues.  Pt reports her Psych APRN instructed her to D/C it due to it being a stimulant.

## 2024-06-11 NOTE — TELEPHONE ENCOUNTER
Caller: Brenda Zazueta    Relationship: Self    Best call back number: [phone]    What is the best time to reach you: ANY    What was the call regarding: PATIENT HAD A MISSED CALL NOTHING NOTED IN THE CHART. PLEASE CALL BACK    Is it okay if the provider responds through MyChart: NO

## 2024-06-12 ENCOUNTER — HOSPITAL ENCOUNTER (OUTPATIENT)
Dept: CARDIOLOGY | Facility: HOSPITAL | Age: 59
Discharge: HOME | End: 2024-06-12
Payer: COMMERCIAL

## 2024-06-12 VITALS — BODY MASS INDEX: 36.84 KG/M2 | WEIGHT: 207.89 LBS | HEIGHT: 63 IN

## 2024-06-12 DIAGNOSIS — R06.02 SHORTNESS OF BREATH: ICD-10-CM

## 2024-06-12 DIAGNOSIS — I10 PRIMARY HYPERTENSION: ICD-10-CM

## 2024-06-12 DIAGNOSIS — I31.39 PERICARDIAL EFFUSION: ICD-10-CM

## 2024-06-12 DIAGNOSIS — R07.9 CHEST PAIN, UNSPECIFIED TYPE: ICD-10-CM

## 2024-06-12 DIAGNOSIS — R60.0 LOWER EXTREMITY EDEMA: ICD-10-CM

## 2024-06-12 PROCEDURE — 0 TECHNETIUM SESTAMIBI: Performed by: INTERNAL MEDICINE

## 2024-06-12 PROCEDURE — 25010000002 REGADENOSON 0.4 MG/5ML SOLUTION: Performed by: INTERNAL MEDICINE

## 2024-06-12 PROCEDURE — A9500 TC99M SESTAMIBI: HCPCS | Performed by: INTERNAL MEDICINE

## 2024-06-12 PROCEDURE — 93308 TTE F-UP OR LMTD: CPT

## 2024-06-12 PROCEDURE — 78452 HT MUSCLE IMAGE SPECT MULT: CPT

## 2024-06-12 PROCEDURE — 93321 DOPPLER ECHO F-UP/LMTD STD: CPT

## 2024-06-12 PROCEDURE — 93017 CV STRESS TEST TRACING ONLY: CPT

## 2024-06-12 RX ORDER — REGADENOSON 0.08 MG/ML
0.4 INJECTION, SOLUTION INTRAVENOUS
Status: COMPLETED | OUTPATIENT
Start: 2024-06-12 | End: 2024-06-12

## 2024-06-12 RX ADMIN — REGADENOSON 0.4 MG: 0.08 INJECTION, SOLUTION INTRAVENOUS at 13:14

## 2024-06-12 RX ADMIN — TECHNETIUM TC 99M SESTAMIBI 1 DOSE: 1 INJECTION INTRAVENOUS at 13:14

## 2024-06-12 RX ADMIN — TECHNETIUM TC 99M SESTAMIBI 1 DOSE: 1 INJECTION INTRAVENOUS at 12:12

## 2024-06-13 ENCOUNTER — TELEPHONE (OUTPATIENT)
Dept: CARDIOLOGY | Facility: CLINIC | Age: 59
End: 2024-06-13
Payer: COMMERCIAL

## 2024-06-13 LAB
BH CV ECHO MEAS - ACS: 1.69 CM
BH CV ECHO MEAS - AO ROOT DIAM: 3.3 CM
BH CV ECHO MEAS - EDV(CUBED): 52.3 ML
BH CV ECHO MEAS - EF(MOD-BP): 52 %
BH CV ECHO MEAS - ESV(CUBED): 21.6 ML
BH CV ECHO MEAS - FS: 25.6 %
BH CV ECHO MEAS - IVS/LVPW: 1.1 CM
BH CV ECHO MEAS - IVSD: 1.3 CM
BH CV ECHO MEAS - LA DIMENSION: 3.4 CM
BH CV ECHO MEAS - LV MASS(C)D: 157.4 GRAMS
BH CV ECHO MEAS - LVIDD: 3.7 CM
BH CV ECHO MEAS - LVIDS: 2.8 CM
BH CV ECHO MEAS - LVPWD: 1.18 CM
BH CV ECHO MEAS - RVDD: 2.6 CM
BH CV REST NUCLEAR ISOTOPE DOSE: 10 MCI
BH CV STRESS COMMENTS STAGE 1: NORMAL
BH CV STRESS DOSE REGADENOSON STAGE 1: 0.4
BH CV STRESS DURATION MIN STAGE 1: 0
BH CV STRESS DURATION SEC STAGE 1: 10
BH CV STRESS NUCLEAR ISOTOPE DOSE: 30 MCI
BH CV STRESS PROTOCOL 1: NORMAL
BH CV STRESS RECOVERY BP: NORMAL MMHG
BH CV STRESS RECOVERY HR: 86 BPM
BH CV STRESS STAGE 1: 1
MAXIMAL PREDICTED HEART RATE: 159 BPM
PERCENT MAX PREDICTED HR: 62.89 %
STRESS BASELINE BP: NORMAL MMHG
STRESS BASELINE HR: 69 BPM
STRESS PERCENT HR: 74 %
STRESS POST PEAK BP: NORMAL MMHG
STRESS POST PEAK HR: 100 BPM
STRESS TARGET HR: 135 BPM

## 2024-06-17 ENCOUNTER — TELEPHONE (OUTPATIENT)
Dept: CARDIOLOGY | Facility: CLINIC | Age: 59
End: 2024-06-17
Payer: COMMERCIAL

## 2024-06-17 RX ORDER — IBUPROFEN 600 MG/1
TABLET ORAL
Qty: 50 TABLET | Refills: 0 | OUTPATIENT
Start: 2024-06-17

## 2024-06-17 RX ORDER — OMEPRAZOLE 20 MG/1
CAPSULE, DELAYED RELEASE ORAL
Qty: 30 CAPSULE | Refills: 0 | OUTPATIENT
Start: 2024-06-17

## 2024-06-17 NOTE — TELEPHONE ENCOUNTER
Pt called the triage line and LVM inquiring why her Ibuprofen and Omeprazole refill requests were denied when she was started on these during her recent hospital stay.  Called and verified the pt's PCP and Pharmacy and explained to her that she needs to contact her PCP for refills on both of these medications.  She verbalized an understanding.  She inquired about her test results, told her Dr. Morgan read these and that Ehsan will review, make recommendations, and send to the MA for them to notify her.

## 2024-06-17 NOTE — TELEPHONE ENCOUNTER
STRESS  Pt notified of no acute findings. Provider will discuss results at f/u. Pt reminded of appt date and time.  ----- Message from Ehsan Merchant sent at 6/17/2024  2:55 PM EDT -----  Routine follow-up

## 2024-06-19 ENCOUNTER — TELEPHONE (OUTPATIENT)
Dept: CARDIOLOGY | Facility: CLINIC | Age: 59
End: 2024-06-19
Payer: COMMERCIAL

## 2024-06-19 NOTE — TELEPHONE ENCOUNTER
Received cardiac clearance request from [facility] seeking cardiac clearance to continue or discontinue Atomoxetine. Placed cardiac clearance request in Alexa's inbox to review and address with provider.

## 2024-06-19 NOTE — TELEPHONE ENCOUNTER
Pharmacy claim for Entresto 24-26MG tablets, prescriber Mayur, has been rejected and requires prior authorization for coverage.    Enalapril Not Required  Losartan Not Required      Per chart review, neither rx is on active/former med list.

## 2024-06-27 NOTE — TELEPHONE ENCOUNTER
Caller: MarbinBrenda hitchcock    Relationship: Self    Best call back number: [phone]    Requested Prescriptions:   Requested Prescriptions     Pending Prescriptions Disp Refills    sacubitril-valsartan (ENTRESTO) 24-26 MG tablet 60 tablet 5     Sig: Take 1 tablet by mouth 2 (Two) Times a Day.        Pharmacy where request should be sent: [address] - [phone] [facility] [phone]     Last office visit with prescribing clinician: 5/21/2024   Last telemedicine visit with prescribing clinician: Visit date not found   Next office visit with prescribing clinician: 7/31/2024     Additional details provided by patient: PT LAST TOOK THIS MEDICATION 6.27.24 AM. PT HAS NO MORE MEDICATION. HUB UNABLE TO CONFIRM ANY SAMPLES FOR PT IN OFFICE DUE TO NO ANSWER ON THE OFFICE PHONE. PLEASE CALL PT IF SAMPLES ARE AVAILABLE TODAY.    Does the patient have less than a 3 day supply:  [x] Yes  [] No    Would you like a call back once the refill request has been completed: [x] Yes [] No    If the office needs to give you a call back, can they leave a voicemail: [x] Yes [] No    Ana Lilia Prado Rep   06/27/24 13:38 EDT

## 2024-07-03 ENCOUNTER — TELEPHONE (OUTPATIENT)
Dept: CARDIOLOGY | Facility: CLINIC | Age: 59
End: 2024-07-03
Payer: COMMERCIAL

## 2024-07-03 ENCOUNTER — PRIOR AUTHORIZATION (OUTPATIENT)
Dept: CARDIOLOGY | Facility: CLINIC | Age: 59
End: 2024-07-03
Payer: COMMERCIAL

## 2024-07-03 NOTE — TELEPHONE ENCOUNTER
Pt LVM stating she need Entresto samples as px won't give her the medicine.       Per chart review, I had initiated PA on 6/19, but covermymeds sent a message from insurance stating:  Pharmacy claim for Entresto 24-26MG tablets, prescriber Mayur, has been rejected and requires prior authorization for coverage.     EnalaprilNot Required  LosartanNot Required        Per chart review, neither rx is on active/former med list.      I spoke w/ Daja, she stated she will attempt PA w/o pt trying or failing either of the above meds.

## 2024-07-03 NOTE — TELEPHONE ENCOUNTER
Message from plan...  [employer] does not manage prior authorizations for this patient. [facility]    Called [facility],

## 2024-07-03 NOTE — TELEPHONE ENCOUNTER
Prior authorization initiated through [employer] for Entresto using [employer] Form instead of [employer]. Status pending.     Case approved, approval letter to be faxed.     Patient notified. Advised to call if needing patient assistance or samples.

## 2024-07-31 ENCOUNTER — OFFICE VISIT (OUTPATIENT)
Dept: CARDIOLOGY | Facility: CLINIC | Age: 59
End: 2024-07-31
Payer: COMMERCIAL

## 2024-07-31 VITALS — SYSTOLIC BLOOD PRESSURE: 139 MMHG | HEIGHT: 63 IN | DIASTOLIC BLOOD PRESSURE: 92 MMHG | BODY MASS INDEX: 36.83 KG/M2

## 2024-07-31 DIAGNOSIS — I31.39 PERICARDIAL EFFUSION: Primary | ICD-10-CM

## 2024-07-31 DIAGNOSIS — E78.00 PURE HYPERCHOLESTEROLEMIA: ICD-10-CM

## 2024-07-31 DIAGNOSIS — I10 PRIMARY HYPERTENSION: ICD-10-CM

## 2024-07-31 DIAGNOSIS — I42.0 DILATED CARDIOMYOPATHY: ICD-10-CM

## 2024-07-31 PROCEDURE — 99214 OFFICE O/P EST MOD 30 MIN: CPT | Performed by: PHYSICIAN ASSISTANT

## 2024-07-31 NOTE — PROGRESS NOTES
Problem list     Subjective   Brenda Zazueta is a 59 y.o. female     Chief Complaint   Patient presents with    Echo follow up    Problem list  1.  Nonobstructive coronary artery disease  1.1 regular treadmill stress test 2021 with no electrocardiographic evidence of ischemia  1.2 non-ST elevation myocardial infarction May 2024 dilated cardiomyopathy  1.3 cardiac catheterization May 2024 with approximately 20% disease involving the LAD with no significant disease otherwise dilated cardiomyopathy  2.  Dilated cardiomyopathy  2.1 EF estimated at 35 to 40% by catheterization and 40 to 45% by echocardiogram May 2024  2.2 repeat echocardiogram June 2024 showed normalization of EF at 50 to 55%  3.  Palpitations  3.1 event monitor 2021 with no arrhythmias identified  4.  Lower extremity edema  5.  Asthma  6.  Pericardial effusion  6.1 moderate pericardial effusion by echocardiogram May 2024  6.2 repeat echocardiogram June 2024 with a trivial to very small pericardial effusion noted       HPI    Patient is a 59-year-old female senting back to the office for assessment.    Patient feels well.  She has no chest pain or shortness of breath at all.  She has no PND, orthopnea, or edema.    She does not palpitate or she complain of dysrhythmic symptoms.    Her main complaints appear to be fatigue.  She has been placed on guideline directed medical therapy aimed at treating her cardiomyopathy.  She was also treated for pericarditis with small pericardial effusion noted with NSAIDs as well as colchicine.  She is feeling better.  However, she feels that she would like to come off some of the medication.  She is stable otherwise.      Current Outpatient Medications on File Prior to Visit   Medication Sig Dispense Refill    ALBUTEROL IN Inhale As Needed.      ARIPiprazole (ABILIFY) 5 MG tablet 1 tablet Daily.      aspirin 81 MG EC tablet Take 1 tablet by mouth Daily.      atorvastatin (LIPITOR) 40 MG tablet Take 1 tablet by mouth  Daily.      cetirizine (zyrTEC) 10 MG tablet Take 1 tablet by mouth Daily.      cyclobenzaprine (FLEXERIL) 10 MG tablet Take 1 tablet by mouth 3 (Three) Times a Day As Needed for Muscle Spasms.      fluticasone (FLONASE) 50 MCG/ACT nasal spray 2 sprays into the nostril(s) as directed by provider Daily.      gabapentin (NEURONTIN) 300 MG capsule Take 1 capsule by mouth 2 (two) times a day.      HYDROcodone-acetaminophen (NORCO) 7.5-325 MG per tablet Take 1 tablet by mouth Every 6 (Six) Hours As Needed for Moderate Pain.      hydrOXYzine (ATARAX) 25 MG tablet Take 1 tablet by mouth 3 (Three) Times a Day As Needed for Itching.      hydrOXYzine pamoate (VISTARIL) 50 MG capsule 25 mg 3 (Three) Times a Day As Needed.      ibuprofen (ADVIL,MOTRIN) 600 MG tablet Take 1 tablet by mouth 3 (Three) Times a Day.      levothyroxine (SYNTHROID, LEVOTHROID) 50 MCG tablet Take  by mouth Daily.      metoprolol succinate XL (TOPROL-XL) 50 MG 24 hr tablet Take 1 tablet by mouth Daily.      Nebulizer device As Needed.      nitroglycerin (NITROSTAT) 0.4 MG SL tablet 1 under the tongue as needed for angina, may repeat q5mins for up three doses 25 tablet 11    omeprazole (priLOSEC) 20 MG capsule Take 1 capsule by mouth Daily.      Ozempic, 0.25 or 0.5 MG/DOSE, 2 MG/3ML solution pen-injector Inject 0.25 mg under the skin into the appropriate area as directed 1 (One) Time Per Week.      sacubitril-valsartan (ENTRESTO) 24-26 MG tablet Take 1 tablet by mouth 2 (Two) Times a Day. 60 tablet 5    triamcinolone (KENALOG) 0.1 % ointment Apply  topically to the appropriate area as directed 2 (Two) Times a Day. 1 each 1    [DISCONTINUED] colchicine 0.6 MG tablet Take 1 tablet by mouth 2 (Two) Times a Day.      [DISCONTINUED] dapagliflozin Propanediol (Farxiga) 10 MG tablet Take  by mouth.      [DISCONTINUED] spironolactone (ALDACTONE) 25 MG tablet Take 1 tablet by mouth Daily.      [DISCONTINUED] atomoxetine (STRATTERA) 40 MG capsule Take 1 capsule  by mouth Daily.       No current facility-administered medications on file prior to visit.       Sulfa antibiotics    Past Medical History:   Diagnosis Date    Anxiety     Asthma     Bipolar 2 disorder     Diabetes mellitus     Fibromyalgia     Hypertension     Hypothyroidism     IBS (irritable bowel syndrome)     Nonischemic cardiomyopathy     OCD (obsessive compulsive disorder)     Rheumatoid arthritis     Sleep apnea        Social History     Socioeconomic History    Marital status: Single   Tobacco Use    Smoking status: Never    Smokeless tobacco: Never   Substance and Sexual Activity    Alcohol use: Not Currently     Alcohol/week: 2.0 standard drinks of alcohol     Types: 2 Drinks containing 0.5 oz of alcohol per week     Comment: Only on special occasions    Drug use: Yes     Types: Marijuana     Comment: Occasionally    Sexual activity: Not Currently     Partners: Male     Birth control/protection: Hysterectomy       Family History   Problem Relation Age of Onset    Heart disease Mother     Hypertension Mother     Thyroid disease Mother     Lymphoma Mother     Anemia Mother     Heart attack Mother     Heart disease Father     Diabetes Father     Hypertension Father     Heart attack Father     Heart attack Brother     Hypertension Brother        Review of Systems   Constitutional:  Positive for fatigue. Negative for activity change, appetite change, chills and fever.   HENT: Negative.  Negative for congestion, sinus pressure and sinus pain.    Eyes: Negative.  Negative for visual disturbance.   Respiratory:  Positive for apnea. Negative for cough, chest tightness, shortness of breath and wheezing.    Cardiovascular: Negative.  Negative for chest pain, palpitations and leg swelling.   Gastrointestinal: Negative.  Negative for blood in stool.   Endocrine: Negative.  Negative for cold intolerance and heat intolerance.   Genitourinary: Negative.  Negative for hematuria.   Musculoskeletal: Negative.  Negative for  "gait problem.   Skin: Negative.  Negative for color change, rash and wound.   Allergic/Immunologic: Negative for environmental allergies and food allergies.   Neurological:  Positive for light-headedness. Negative for dizziness, syncope, weakness, numbness and headaches.   Hematological:  Does not bruise/bleed easily.   Psychiatric/Behavioral: Negative.  Negative for sleep disturbance.        Objective   Vitals:    07/31/24 1135   BP: 139/92   BP Location: Right arm   Patient Position: Sitting   Cuff Size: Adult   Height: 160 cm (63\")      /92 (BP Location: Right arm, Patient Position: Sitting, Cuff Size: Adult)   Ht 160 cm (63\")   BMI 36.83 kg/m²     Lab Results (most recent)       None            Physical Exam  Vitals and nursing note reviewed.   Constitutional:       General: She is not in acute distress.     Appearance: Normal appearance. She is well-developed.   HENT:      Head: Normocephalic and atraumatic.   Eyes:      General: No scleral icterus.        Right eye: No discharge.         Left eye: No discharge.      Conjunctiva/sclera: Conjunctivae normal.   Neck:      Vascular: No carotid bruit.   Cardiovascular:      Rate and Rhythm: Normal rate and regular rhythm.      Heart sounds: Normal heart sounds. No murmur heard.     No friction rub. No gallop.   Pulmonary:      Effort: Pulmonary effort is normal. No respiratory distress.      Breath sounds: Normal breath sounds. No wheezing or rales.   Chest:      Chest wall: No tenderness.   Musculoskeletal:      Right lower leg: No edema.      Left lower leg: No edema.   Skin:     General: Skin is warm and dry.      Coloration: Skin is not pale.      Findings: No erythema or rash.   Neurological:      Mental Status: She is alert and oriented to person, place, and time.      Cranial Nerves: No cranial nerve deficit.   Psychiatric:         Behavior: Behavior normal.         Procedure   Procedures       Assessment & Plan     Problems Addressed this Visit  "         Cardiac and Vasculature    Primary hypertension    Pericardial effusion - Primary    Pure hypercholesterolemia    Dilated cardiomyopathy     Diagnoses         Codes Comments    Pericardial effusion    -  Primary ICD-10-CM: I31.39  ICD-9-CM: 423.9     Dilated cardiomyopathy     ICD-10-CM: I42.0  ICD-9-CM: 425.4     Primary hypertension     ICD-10-CM: I10  ICD-9-CM: 401.9     Pure hypercholesterolemia     ICD-10-CM: E78.00  ICD-9-CM: 272.0             Recommendations  1.  Patient is a 59-year-old female with dilated cardiomyopathy felt to be Takotsubo cardiomyopathy that has had normalization of EF.  She is euvolemic by examination and on guideline directed medical therapy.  I would prefer to continue it.  However, she feels that she would like to come off of some of the medication and has significant fatigue.  She is also had GI upset.  At the very least, I would want her to stay on metoprolol as well as Entresto.  Spironolactone can be stopped as well as Farxiga for her to be on it.  Patient has no symptoms of pericarditis.  Ibuprofen and colchicine.  Hopefully,  discontinuation of some of the medications will improve her symptoms.  However, it was discussed with her that if she noticed edema or shortness of breath or cardiac symptoms such as chest pain or palpitations, I want her to call the office.  We may have to institute the medication back.  Patient describes undergoing a severe stressful incident prior to hospitalization which she felt was the potential source of her Takotsubo cardiomyopathy.  Will consider repeat echocardiogram in a year or sooner if needed.    2.  Patient with pericardial effusion that is resolving by echocardiogram from May to June.  It is very small to trivial.  We can monitor for now.    3.  Patient with dyslipidemia on statin therapy.  I will continue medication at this time.    4.  Patient's blood pressure currently stable.  I will make no changes.  We can see her back for  follow-up in 6 months or sooner if needed.  Follow-up with primary as scheduled.       Patient brought in medicine bottles to appointment, they have been gone through with the patient. Med list was updated in the chart.                Electronically signed by:

## 2024-07-31 NOTE — LETTER
July 31, 2024       No Recipients    Patient: Brenda Zazueta   YOB: 1965   Date of Visit: 7/31/2024       Dear Darryl Carmichael MD    Brenda Zazueta was in my office today. Below is a copy of my note.    If you have questions, please do not hesitate to call me. I look forward to following Brenda along with you.         Sincerely,        BRYSON Snowden        CC:   No Recipients    Problem list     Subjective  Brenda Zazueta is a 59 y.o. female     Chief Complaint   Patient presents with   • Echo follow up    Problem list  1.  Nonobstructive coronary artery disease  1.1 regular treadmill stress test 2021 with no electrocardiographic evidence of ischemia  1.2 non-ST elevation myocardial infarction May 2024 dilated cardiomyopathy  1.3 cardiac catheterization May 2024 with approximately 20% disease involving the LAD with no significant disease otherwise dilated cardiomyopathy  2.  Dilated cardiomyopathy  2.1 EF estimated at 35 to 40% by catheterization and 40 to 45% by echocardiogram May 2024  2.2 repeat echocardiogram June 2024 showed normalization of EF at 50 to 55%  3.  Palpitations  3.1 event monitor 2021 with no arrhythmias identified  4.  Lower extremity edema  5.  Asthma  6.  Pericardial effusion  6.1 moderate pericardial effusion by echocardiogram May 2024  6.2 repeat echocardiogram June 2024 with a trivial to very small pericardial effusion noted       HPI    Patient is a 59-year-old female senting back to the office for assessment.    Patient feels well.  She has no chest pain or shortness of breath at all.  She has no PND, orthopnea, or edema.    She does not palpitate or she complain of dysrhythmic symptoms.    Her main complaints appear to be fatigue.  She has been placed on guideline directed medical therapy aimed at treating her cardiomyopathy.  She was also treated for pericarditis with small pericardial effusion noted with NSAIDs as well as colchicine.  She is feeling better.   However, she feels that she would like to come off some of the medication.  She is stable otherwise.      Current Outpatient Medications on File Prior to Visit   Medication Sig Dispense Refill   • ALBUTEROL IN Inhale As Needed.     • ARIPiprazole (ABILIFY) 5 MG tablet 1 tablet Daily.     • aspirin 81 MG EC tablet Take 1 tablet by mouth Daily.     • atorvastatin (LIPITOR) 40 MG tablet Take 1 tablet by mouth Daily.     • cetirizine (zyrTEC) 10 MG tablet Take 1 tablet by mouth Daily.     • cyclobenzaprine (FLEXERIL) 10 MG tablet Take 1 tablet by mouth 3 (Three) Times a Day As Needed for Muscle Spasms.     • fluticasone (FLONASE) 50 MCG/ACT nasal spray 2 sprays into the nostril(s) as directed by provider Daily.     • gabapentin (NEURONTIN) 300 MG capsule Take 1 capsule by mouth 2 (two) times a day.     • HYDROcodone-acetaminophen (NORCO) 7.5-325 MG per tablet Take 1 tablet by mouth Every 6 (Six) Hours As Needed for Moderate Pain.     • hydrOXYzine (ATARAX) 25 MG tablet Take 1 tablet by mouth 3 (Three) Times a Day As Needed for Itching.     • hydrOXYzine pamoate (VISTARIL) 50 MG capsule 25 mg 3 (Three) Times a Day As Needed.     • ibuprofen (ADVIL,MOTRIN) 600 MG tablet Take 1 tablet by mouth 3 (Three) Times a Day.     • levothyroxine (SYNTHROID, LEVOTHROID) 50 MCG tablet Take  by mouth Daily.     • metoprolol succinate XL (TOPROL-XL) 50 MG 24 hr tablet Take 1 tablet by mouth Daily.     • Nebulizer device As Needed.     • nitroglycerin (NITROSTAT) 0.4 MG SL tablet 1 under the tongue as needed for angina, may repeat q5mins for up three doses 25 tablet 11   • omeprazole (priLOSEC) 20 MG capsule Take 1 capsule by mouth Daily.     • Ozempic, 0.25 or 0.5 MG/DOSE, 2 MG/3ML solution pen-injector Inject 0.25 mg under the skin into the appropriate area as directed 1 (One) Time Per Week.     • sacubitril-valsartan (ENTRESTO) 24-26 MG tablet Take 1 tablet by mouth 2 (Two) Times a Day. 60 tablet 5   • triamcinolone (KENALOG) 0.1 %  ointment Apply  topically to the appropriate area as directed 2 (Two) Times a Day. 1 each 1   • [DISCONTINUED] colchicine 0.6 MG tablet Take 1 tablet by mouth 2 (Two) Times a Day.     • [DISCONTINUED] dapagliflozin Propanediol (Farxiga) 10 MG tablet Take  by mouth.     • [DISCONTINUED] spironolactone (ALDACTONE) 25 MG tablet Take 1 tablet by mouth Daily.     • [DISCONTINUED] atomoxetine (STRATTERA) 40 MG capsule Take 1 capsule by mouth Daily.       No current facility-administered medications on file prior to visit.       Sulfa antibiotics    Past Medical History:   Diagnosis Date   • Anxiety    • Asthma    • Bipolar 2 disorder    • Diabetes mellitus    • Fibromyalgia    • Hypertension    • Hypothyroidism    • IBS (irritable bowel syndrome)    • Nonischemic cardiomyopathy    • OCD (obsessive compulsive disorder)    • Rheumatoid arthritis    • Sleep apnea        Social History     Socioeconomic History   • Marital status: Single   Tobacco Use   • Smoking status: Never   • Smokeless tobacco: Never   Substance and Sexual Activity   • Alcohol use: Not Currently     Alcohol/week: 2.0 standard drinks of alcohol     Types: 2 Drinks containing 0.5 oz of alcohol per week     Comment: Only on special occasions   • Drug use: Yes     Types: Marijuana     Comment: Occasionally   • Sexual activity: Not Currently     Partners: Male     Birth control/protection: Hysterectomy       Family History   Problem Relation Age of Onset   • Heart disease Mother    • Hypertension Mother    • Thyroid disease Mother    • Lymphoma Mother    • Anemia Mother    • Heart attack Mother    • Heart disease Father    • Diabetes Father    • Hypertension Father    • Heart attack Father    • Heart attack Brother    • Hypertension Brother        Review of Systems   Constitutional:  Positive for fatigue. Negative for activity change, appetite change, chills and fever.   HENT: Negative.  Negative for congestion, sinus pressure and sinus pain.    Eyes:  "Negative.  Negative for visual disturbance.   Respiratory:  Positive for apnea. Negative for cough, chest tightness, shortness of breath and wheezing.    Cardiovascular: Negative.  Negative for chest pain, palpitations and leg swelling.   Gastrointestinal: Negative.  Negative for blood in stool.   Endocrine: Negative.  Negative for cold intolerance and heat intolerance.   Genitourinary: Negative.  Negative for hematuria.   Musculoskeletal: Negative.  Negative for gait problem.   Skin: Negative.  Negative for color change, rash and wound.   Allergic/Immunologic: Negative for environmental allergies and food allergies.   Neurological:  Positive for light-headedness. Negative for dizziness, syncope, weakness, numbness and headaches.   Hematological:  Does not bruise/bleed easily.   Psychiatric/Behavioral: Negative.  Negative for sleep disturbance.        Objective  Vitals:    07/31/24 1135   BP: 139/92   BP Location: Right arm   Patient Position: Sitting   Cuff Size: Adult   Height: 160 cm (63\")      /92 (BP Location: Right arm, Patient Position: Sitting, Cuff Size: Adult)   Ht 160 cm (63\")   BMI 36.83 kg/m²     Lab Results (most recent)       None            Physical Exam  Vitals and nursing note reviewed.   Constitutional:       General: She is not in acute distress.     Appearance: Normal appearance. She is well-developed.   HENT:      Head: Normocephalic and atraumatic.   Eyes:      General: No scleral icterus.        Right eye: No discharge.         Left eye: No discharge.      Conjunctiva/sclera: Conjunctivae normal.   Neck:      Vascular: No carotid bruit.   Cardiovascular:      Rate and Rhythm: Normal rate and regular rhythm.      Heart sounds: Normal heart sounds. No murmur heard.     No friction rub. No gallop.   Pulmonary:      Effort: Pulmonary effort is normal. No respiratory distress.      Breath sounds: Normal breath sounds. No wheezing or rales.   Chest:      Chest wall: No tenderness. "   Musculoskeletal:      Right lower leg: No edema.      Left lower leg: No edema.   Skin:     General: Skin is warm and dry.      Coloration: Skin is not pale.      Findings: No erythema or rash.   Neurological:      Mental Status: She is alert and oriented to person, place, and time.      Cranial Nerves: No cranial nerve deficit.   Psychiatric:         Behavior: Behavior normal.         Procedure  Procedures       Assessment & Plan    Problems Addressed this Visit          Cardiac and Vasculature    Primary hypertension    Pericardial effusion - Primary    Pure hypercholesterolemia    Dilated cardiomyopathy     Diagnoses         Codes Comments    Pericardial effusion    -  Primary ICD-10-CM: I31.39  ICD-9-CM: 423.9     Dilated cardiomyopathy     ICD-10-CM: I42.0  ICD-9-CM: 425.4     Primary hypertension     ICD-10-CM: I10  ICD-9-CM: 401.9     Pure hypercholesterolemia     ICD-10-CM: E78.00  ICD-9-CM: 272.0             Recommendations  1.  Patient is a 59-year-old female with dilated cardiomyopathy felt to be Takotsubo cardiomyopathy that has had normalization of EF.  She is euvolemic by examination and on guideline directed medical therapy.  I would prefer to continue it.  However, she feels that she would like to come off of some of the medication and has significant fatigue.  She is also had GI upset.  At the very least, I would want her to stay on metoprolol as well as Entresto.  Spironolactone can be stopped as well as Farxiga for her to be on it.  Patient has no symptoms of pericarditis.  Ibuprofen and colchicine.  Hopefully,  discontinuation of some of the medications will improve her symptoms.  However, it was discussed with her that if she noticed edema or shortness of breath or cardiac symptoms such as chest pain or palpitations, I want her to call the office.  We may have to institute the medication back.  Patient describes undergoing a severe stressful incident prior to hospitalization which she felt was  the potential source of her Takotsubo cardiomyopathy.  Will consider repeat echocardiogram in a year or sooner if needed.    2.  Patient with pericardial effusion that is resolving by echocardiogram from May to June.  It is very small to trivial.  We can monitor for now.    3.  Patient with dyslipidemia on statin therapy.  I will continue medication at this time.    4.  Patient's blood pressure currently stable.  I will make no changes.  We can see her back for follow-up in 6 months or sooner if needed.  Follow-up with primary as scheduled.       Patient brought in medicine bottles to appointment, they have been gone through with the patient. Med list was updated in the chart.                Electronically signed by:

## 2024-09-17 RX ORDER — ASPIRIN 81 MG/1
81 TABLET, COATED ORAL DAILY
Qty: 30 TABLET | Refills: 2 | Status: SHIPPED | OUTPATIENT
Start: 2024-09-17

## 2025-05-19 ENCOUNTER — OFFICE VISIT (OUTPATIENT)
Dept: CARDIOLOGY | Facility: CLINIC | Age: 60
End: 2025-05-19
Payer: COMMERCIAL

## 2025-05-19 VITALS
SYSTOLIC BLOOD PRESSURE: 120 MMHG | HEIGHT: 63 IN | BODY MASS INDEX: 39.87 KG/M2 | WEIGHT: 225 LBS | HEART RATE: 69 BPM | DIASTOLIC BLOOD PRESSURE: 80 MMHG | OXYGEN SATURATION: 94 %

## 2025-05-19 DIAGNOSIS — I31.39 PERICARDIAL EFFUSION: Primary | ICD-10-CM

## 2025-05-19 DIAGNOSIS — R07.9 CHEST PAIN, UNSPECIFIED TYPE: ICD-10-CM

## 2025-05-19 DIAGNOSIS — R06.02 SHORTNESS OF BREATH: ICD-10-CM

## 2025-05-19 DIAGNOSIS — I42.0 DILATED CARDIOMYOPATHY: ICD-10-CM

## 2025-05-19 PROCEDURE — 99214 OFFICE O/P EST MOD 30 MIN: CPT | Performed by: PHYSICIAN ASSISTANT

## 2025-05-19 RX ORDER — METOPROLOL SUCCINATE 25 MG/1
25 TABLET, EXTENDED RELEASE ORAL DAILY
Qty: 30 TABLET | Refills: 11 | Status: SHIPPED | OUTPATIENT
Start: 2025-05-19

## 2025-05-20 ENCOUNTER — HOSPITAL ENCOUNTER (OUTPATIENT)
Dept: CARDIOLOGY | Facility: HOSPITAL | Age: 60
Discharge: HOME | End: 2025-05-20
Admitting: PHYSICIAN ASSISTANT
Payer: COMMERCIAL

## 2025-05-20 DIAGNOSIS — R06.02 SHORTNESS OF BREATH: ICD-10-CM

## 2025-05-20 DIAGNOSIS — I42.0 DILATED CARDIOMYOPATHY: ICD-10-CM

## 2025-05-20 DIAGNOSIS — R07.9 CHEST PAIN, UNSPECIFIED TYPE: ICD-10-CM

## 2025-05-20 DIAGNOSIS — I31.39 PERICARDIAL EFFUSION: ICD-10-CM

## 2025-05-20 LAB
AV MEAN PRESS GRAD SYS DOP V1V2: 2.7 MMHG
AV VMAX SYS DOP: 109.9 CM/SEC
BH CV ECHO MEAS - ACS: 2.02 CM
BH CV ECHO MEAS - AO MAX PG: 4.8 MMHG
BH CV ECHO MEAS - AO ROOT DIAM: 3.3 CM
BH CV ECHO MEAS - AO V2 VTI: 25.6 CM
BH CV ECHO MEAS - EDV(CUBED): 40.7 ML
BH CV ECHO MEAS - EDV(MOD-SP4): 64.6 ML
BH CV ECHO MEAS - EF(MOD-SP4): 70.7 %
BH CV ECHO MEAS - ESV(CUBED): 8 ML
BH CV ECHO MEAS - ESV(MOD-SP4): 18.9 ML
BH CV ECHO MEAS - FS: 41.9 %
BH CV ECHO MEAS - IVS/LVPW: 0.98 CM
BH CV ECHO MEAS - IVSD: 1.31 CM
BH CV ECHO MEAS - LA DIMENSION: 3.2 CM
BH CV ECHO MEAS - LAT PEAK E' VEL: 7 CM/SEC
BH CV ECHO MEAS - LV DIASTOLIC VOL/BSA (35-75): 31.8 CM2
BH CV ECHO MEAS - LV MASS(C)D: 154.7 GRAMS
BH CV ECHO MEAS - LV SYSTOLIC VOL/BSA (12-30): 9.3 CM2
BH CV ECHO MEAS - LVIDD: 3.4 CM
BH CV ECHO MEAS - LVIDS: 2 CM
BH CV ECHO MEAS - LVPWD: 1.34 CM
BH CV ECHO MEAS - MED PEAK E' VEL: 4.7 CM/SEC
BH CV ECHO MEAS - MV A MAX VEL: 97.1 CM/SEC
BH CV ECHO MEAS - MV DEC TIME: 0.25 SEC
BH CV ECHO MEAS - MV E MAX VEL: 66.4 CM/SEC
BH CV ECHO MEAS - MV E/A: 0.68
BH CV ECHO MEAS - RVDD: 3 CM
BH CV ECHO MEAS - SV(MOD-SP4): 45.7 ML
BH CV ECHO MEAS - SVI(MOD-SP4): 22.5 ML/M2
BH CV ECHO MEASUREMENTS AVERAGE E/E' RATIO: 11.35
IVRT: 134 MS
LEFT ATRIUM VOLUME INDEX: 8.1 ML/M2

## 2025-05-20 PROCEDURE — 93306 TTE W/DOPPLER COMPLETE: CPT

## 2025-06-30 ENCOUNTER — TELEPHONE (OUTPATIENT)
Dept: CARDIOLOGY | Facility: CLINIC | Age: 60
End: 2025-06-30
Payer: COMMERCIAL

## 2025-06-30 NOTE — TELEPHONE ENCOUNTER
I called patient and went over Echo results no acute findings . Advised monitor results are not finalized with providers recommendations . We will call back once we receive them.